# Patient Record
Sex: FEMALE | Race: WHITE | NOT HISPANIC OR LATINO | Employment: FULL TIME | URBAN - METROPOLITAN AREA
[De-identification: names, ages, dates, MRNs, and addresses within clinical notes are randomized per-mention and may not be internally consistent; named-entity substitution may affect disease eponyms.]

---

## 2017-04-05 ENCOUNTER — OFFICE VISIT (OUTPATIENT)
Dept: URGENT CARE | Facility: PHYSICIAN GROUP | Age: 57
End: 2017-04-05
Payer: COMMERCIAL

## 2017-04-05 VITALS
RESPIRATION RATE: 16 BRPM | DIASTOLIC BLOOD PRESSURE: 76 MMHG | BODY MASS INDEX: 30.36 KG/M2 | SYSTOLIC BLOOD PRESSURE: 124 MMHG | HEART RATE: 80 BPM | TEMPERATURE: 98.6 F | HEIGHT: 62 IN | OXYGEN SATURATION: 96 % | WEIGHT: 165 LBS

## 2017-04-05 DIAGNOSIS — J01.40 ACUTE NON-RECURRENT PANSINUSITIS: Primary | ICD-10-CM

## 2017-04-05 DIAGNOSIS — M94.0 ACUTE COSTOCHONDRITIS: ICD-10-CM

## 2017-04-05 DIAGNOSIS — R05.9 COUGH: ICD-10-CM

## 2017-04-05 DIAGNOSIS — J30.1 SEASONAL ALLERGIC RHINITIS DUE TO POLLEN: ICD-10-CM

## 2017-04-05 PROCEDURE — 99214 OFFICE O/P EST MOD 30 MIN: CPT | Performed by: PHYSICIAN ASSISTANT

## 2017-04-05 RX ORDER — CLARITHROMYCIN 500 MG/1
500 TABLET, COATED ORAL 2 TIMES DAILY
Qty: 20 TAB | Refills: 0 | Status: SHIPPED | OUTPATIENT
Start: 2017-04-05 | End: 2017-10-13

## 2017-04-05 RX ORDER — FLUCONAZOLE 150 MG/1
150 TABLET ORAL DAILY
Qty: 1 TAB | Refills: 0 | Status: SHIPPED | OUTPATIENT
Start: 2017-04-05 | End: 2017-10-13

## 2017-04-05 RX ORDER — CODEINE PHOSPHATE/GUAIFENESIN 10-100MG/5
10 LIQUID (ML) ORAL 4 TIMES DAILY PRN
Qty: 200 ML | Refills: 0 | Status: SHIPPED | OUTPATIENT
Start: 2017-04-05 | End: 2017-10-13

## 2017-04-05 ASSESSMENT — ENCOUNTER SYMPTOMS
FEVER: 0
WHEEZING: 0
MYALGIAS: 1
SPUTUM PRODUCTION: 1
COUGH: 1
SHORTNESS OF BREATH: 0

## 2017-04-05 NOTE — MR AVS SNAPSHOT
"        Isabelle Jeffers Schwab   2017 9:00 AM   Office Visit   MRN: [id number]    Department:  [facility]   Dept Phone:  [phone]    Description:  Female : 1960   Provider:  Peggy Chappell PA-C           Reason for Visit     Cough C/o wet cough, runny nose, post nasal drip, sore chest from coughing x2 weeks      Allergies as of 2017     Allergen Noted Reactions    Nitrofurantoin 2014       Penicillins 2014       Sulfa Drugs 2014         You were diagnosed with     Acute non-recurrent pansinusitis   [4111729]  -  Primary     Cough   [786.2.ICD-9-CM]       Seasonal allergic rhinitis due to pollen   [[id number]]         Vital Signs     Blood Pressure Pulse Temperature Respirations Height Weight    124/76 mmHg 80 37 °C (98.6 °F) 16 1.575 m (5' 2.01\") 74.844 kg (165 lb)    Body Mass Index Oxygen Saturation Smoking Status             30.17 kg/m2 96% Current Every Day Smoker         Basic Information     Date Of Birth Sex Race Ethnicity Preferred Language    1960 Female White Non- English      Problem List              ICD-10-CM Priority Class Noted - Resolved    Postmenopausal HRT (hormone replacement therapy) Z79.890   2014 - Present    Essential hypertension I10   10/9/2015 - Present    EBV infection B27.90   2016 - Present    Sleep apnea G47.30   2016 - Present    Overweight E66.3   2016 - Present    Dyslipidemia E78.5   2016 - Present    Vitamin D deficiency E55.9   2016 - Present    Tobacco abuse Z72.0   2016 - Present    Postmenopausal status Z78.0   2016 - Present      Health Maintenance        Date Due Completion Dates    IMM DTaP/Tdap/Td Vaccine (1 - Tdap) 1978 ---    IMM PNEUMOCOCCAL 19-64 (ADULT) MEDIUM RISK SERIES (1 of 1 - PPSV23) 1978 ---    MAMMOGRAM 2008, 10/12/2007, 10/12/2007    PAP SMEAR 2014 (Prv Comp)    Override on 2011: Previously completed    COLONOSCOPY " 9/9/2020 9/9/2010 (Prv Comp)    Override on 9/9/2010: Previously completed            Current Immunizations     No immunizations on file.      Below and/or attached are the medications your provider expects you to take. Review all of your home medications and newly ordered medications with your provider and/or pharmacist. Follow medication instructions as directed by your provider and/or pharmacist. Please keep your medication list with you and share with your provider. Update the information when medications are discontinued, doses are changed, or new medications (including over-the-counter products) are added; and carry medication information at all times in the event of emergency situations     Allergies:  NITROFURANTOIN - (reactions not documented)     PENICILLINS - (reactions not documented)     SULFA DRUGS - (reactions not documented)               Medications  Valid as of: April 05, 2017 -  9:36 AM    Generic Name Brand Name Tablet Size Instructions for use    Azithromycin (Tab) ZITHROMAX 250 MG 2 TABS ON DAY 1, 1 TAB ON DAYS 2-5.        Benazepril-Hydrochlorothiazide (Tab) LOTENSIN HCT 20-12.5 MG TAKE 1 TAB BY MOUTH EVERY DAY.        Clarithromycin (Tab) BIAXIN 500 MG Take 1 Tab by mouth 2 times a day.        Estradiol (Tab) ESTRACE 1 MG TAKE 1 TAB BY MOUTH EVERY DAY.        Fluconazole (Tab) DIFLUCAN 150 MG Take 1 Tab by mouth every day.        Guaifenesin-Codeine (Syrup) TUSSI-ORGANIDIN -10 MG/5ML Take 10 mL by mouth 4 times a day as needed for Cough.        Loratadine   Take  by mouth.        .                 Medicines prescribed today were sent to:     [facility] #[id number] - [address] - [place] [address]    Phone: [phone] Fax: [phone]    Open 24 Hours?: No      Medication refill instructions:       If your prescription bottle indicates you have medication refills left, it is not necessary to call your provider’s office. Please contact your pharmacy and they will  refill your medication.    If your prescription bottle indicates you do not have any refills left, you may request refills at any time through one of the following ways: The online [email or web address] system (except Urgent Care), by calling your provider’s office, or by asking your pharmacy to contact your provider’s office with a refill request. Medication refills are processed only during regular business hours and may not be available until the next business day. Your provider may request additional information or to have a follow-up visit with you prior to refilling your medication.   *Please Note: Medication refills are assigned a new Rx number when refilled electronically. Your pharmacy may indicate that no refills were authorized even though a new prescription for the same medication is available at the pharmacy. Please request the medicine by name with the pharmacy before contacting your provider for a refill.           [email or web address] Access Code: Activation code not generated  Current Women.com Status: Active          Quit Tobacco Information     Do you want to quit using tobacco?    Quitting tobacco decreases risks of cancer, heart and lung disease, increases life expectancy, improves sense of taste and smell, and increases spending money, among other benefits.    If you are thinking about quitting, we can help.  • [facility] Quit Tobacco Program: [phone]  o Program occurs weekly for four weeks and includes pharmacist consultation on products to support quitting smoking or chewing tobacco. A provider referral is needed for pharmacist consultation.  • Tobacco Users Help Hotline: 2-589-QUIT-NOW (454-5500) or https://[place].quitlogix.org/  o Free, confidential telephone and online coaching for [place] residents. Sessions are designed on a schedule that is convenient for you. Eligible clients receive free nicotine replacement therapy.  • Nationally: www.smokefree.gov  o Information and professional assistance to support both  immediate and long-term needs as you become, and remain, a non-smoker. Smokefree.gov allows you to choose the help that best fits your needs.

## 2017-04-05 NOTE — PROGRESS NOTES
Subjective:      Elizabeth Madel Schwab is a 56 y.o. female who presents with Cough    PMH:  has a past medical history of Kidney disease; EBV infection (5/17/2016); Vitamin D deficiency (5/17/2016); Hypertension; Shingles; CAP (community acquired pneumonia); and Postmenopausal HRT (hormone replacement therapy).  MEDS:   Current outpatient prescriptions:   •  Loratadine (CLARITIN PO), Take  by mouth., Disp: , Rfl:   •  benazepril-hydrochlorthiazide (LOTENSIN HCT) 20-12.5 MG per tablet, TAKE 1 TAB BY MOUTH EVERY DAY., Disp: 30 Tab, Rfl: 0  •  estradiol (ESTRACE) 1 MG Tab, TAKE 1 TAB BY MOUTH EVERY DAY., Disp: 30 Tab, Rfl: 0  •  azithromycin (ZITHROMAX) 250 MG Tab, 2 TABS ON DAY 1, 1 TAB ON DAYS 2-5., Disp: 6 Tab, Rfl: 1  ALLERGIES:   Allergies   Allergen Reactions   • Nitrofurantoin    • Penicillins    • Sulfa Drugs      SURGHX:   Past Surgical History   Procedure Laterality Date   • Abdominal hysterectomy total       SOCHX:  reports that she has been smoking Cigarettes.  She has been smoking about 0.50 packs per day. She has never used smokeless tobacco. She reports that she does not drink alcohol or use illicit drugs.  FH: family history includes Arthritis in her mother; Dementia in her mother; Heart Attack in her father; Heart Disease in her father. Reviewed with patient/family. Not pertinent to this complaint.            HPI Comments: Patient presents with:  Cough: C/o wet cough, runny nose, post nasal drip, sore chest from coughing x2 weeks        Cough  This is a new problem. The current episode started 1 to 4 weeks ago. The problem has been unchanged. The problem occurs every few minutes. Associated symptoms include myalgias. Pertinent negatives include no fever, shortness of breath or wheezing. The symptoms are aggravated by lying down and pollens (exertion). Risk factors for lung disease include smoking/tobacco exposure. She has tried body position changes, OTC cough suppressant and rest for the symptoms.  "The treatment provided no relief. Her past medical history is significant for bronchitis and environmental allergies.       Review of Systems   Constitutional: Negative for fever.   HENT: Positive for congestion.    Respiratory: Positive for cough and sputum production. Negative for shortness of breath and wheezing.    Musculoskeletal: Positive for myalgias.   Endo/Heme/Allergies: Positive for environmental allergies.   All other systems reviewed and are negative.         Objective:     /76 mmHg  Pulse 80  Temp(Src) 37 °C (98.6 °F)  Resp 16  Ht 1.575 m (5' 2.01\")  Wt 74.844 kg (165 lb)  BMI 30.17 kg/m2  SpO2 96%     Physical Exam   Constitutional: She is oriented to person, place, and time. She appears well-developed and well-nourished. No distress.   HENT:   Head: Normocephalic.   Right Ear: Tympanic membrane normal.   Left Ear: Tympanic membrane normal.   Nose: Mucosal edema present. Right sinus exhibits maxillary sinus tenderness and frontal sinus tenderness. Left sinus exhibits maxillary sinus tenderness and frontal sinus tenderness.   Mouth/Throat: Uvula is midline and mucous membranes are normal.   PND visible in posterior oropharynx   Eyes: EOM are normal. Pupils are equal, round, and reactive to light.   Neck: Normal range of motion. Neck supple.   Cardiovascular: Normal rate, regular rhythm and normal heart sounds.    Pulmonary/Chest: Effort normal. No respiratory distress. She has no wheezes. She has rhonchi. She has no rales.   Abdominal: Soft.   Musculoskeletal: Normal range of motion.   Lymphadenopathy:     She has no cervical adenopathy.   Neurological: She is alert and oriented to person, place, and time.   Skin: Skin is warm and dry.   Psychiatric: She has a normal mood and affect.   Nursing note and vitals reviewed.         Assessment/Plan:     1. Acute non-recurrent pansinusitis  Loratadine (CLARITIN PO)    clarithromycin (BIAXIN) 500 MG Tab    guaifenesin-codeine (TUSSI-ORGANIDIN NR) " 100-10 MG/5ML syrup    fluconazole (DIFLUCAN) 150 MG tablet   2. Cough  Loratadine (CLARITIN PO)    clarithromycin (BIAXIN) 500 MG Tab    guaifenesin-codeine (TUSSI-ORGANIDIN NR) 100-10 MG/5ML syrup    fluconazole (DIFLUCAN) 150 MG tablet   3. Seasonal allergic rhinitis due to pollen  Loratadine (CLARITIN PO)    clarithromycin (BIAXIN) 500 MG Tab    guaifenesin-codeine (TUSSI-ORGANIDIN NR) 100-10 MG/5ML syrup    fluconazole (DIFLUCAN) 150 MG tablet   4. Acute costochondritis  Loratadine (CLARITIN PO)    clarithromycin (BIAXIN) 500 MG Tab    guaifenesin-codeine (TUSSI-ORGANIDIN NR) 100-10 MG/5ML syrup    fluconazole (DIFLUCAN) 150 MG tablet     PT can continue OTC medications, increase fluids and rest until symptoms improve.     Pt to add OTC Coricidin HBP (not sudafed-pt verbalized understanding of this) for congestion.      PT should follow up with PCP in 1-2 days for re-evaluation if symptoms have not improved.  Discussed red flags and reasons to return to UC or ED.  Pt and/or family verbalized understanding of diagnosis and follow up instructions and declined informational handout on diagnosis.  PT discharged.

## 2017-10-13 ENCOUNTER — TELEPHONE (OUTPATIENT)
Dept: URGENT CARE | Facility: PHYSICIAN GROUP | Age: 57
End: 2017-10-13

## 2017-10-13 ENCOUNTER — OFFICE VISIT (OUTPATIENT)
Dept: URGENT CARE | Facility: PHYSICIAN GROUP | Age: 57
End: 2017-10-13
Payer: COMMERCIAL

## 2017-10-13 VITALS
TEMPERATURE: 98.4 F | RESPIRATION RATE: 16 BRPM | SYSTOLIC BLOOD PRESSURE: 150 MMHG | HEIGHT: 62 IN | OXYGEN SATURATION: 97 % | DIASTOLIC BLOOD PRESSURE: 90 MMHG | BODY MASS INDEX: 29.81 KG/M2 | HEART RATE: 90 BPM | WEIGHT: 162 LBS

## 2017-10-13 DIAGNOSIS — T78.40XA ALLERGIC REACTION, INITIAL ENCOUNTER: ICD-10-CM

## 2017-10-13 PROCEDURE — 99213 OFFICE O/P EST LOW 20 MIN: CPT | Performed by: PHYSICIAN ASSISTANT

## 2017-10-13 RX ORDER — CLINDAMYCIN HYDROCHLORIDE 300 MG/1
300 CAPSULE ORAL 3 TIMES DAILY
COMMUNITY
End: 2017-10-13

## 2017-10-13 RX ORDER — METHYLPREDNISOLONE 4 MG/1
TABLET ORAL
Qty: 21 TAB | Refills: 0 | Status: SHIPPED | OUTPATIENT
Start: 2017-10-13 | End: 2017-12-28

## 2017-10-13 NOTE — PROGRESS NOTES
Chief Complaint   Patient presents with   • Bug Bite     L. foot and hand swelling and pain, nausea, chills  starting yesterday       HISTORY OF PRESENT ILLNESS: Patient is a 57 y.o. female who presents today for about 24 hours of feeling unwell.  Patient states it started with itching, swelling locally on the bottom of her left foot. Swelling down there today but still painful.  She also noticed this morning that her left hand was swollen, itchy.   She has had chills on and off since these both began without fevers.   Patient has had looser stools in last few days without abdominal pain but is currently on her second round of Clindamycin after dental work.   She states in waiting room she had a few mins of chest tightness but felt that had to do with feeling anxious about her visit as it has completely resolved.   She took a 10 mg Claritin today. Seems to have helped the foot swelling.     Patient Active Problem List    Diagnosis Date Noted   • EBV infection 05/17/2016   • Sleep apnea 05/17/2016   • Overweight 05/17/2016   • Dyslipidemia 05/17/2016   • Vitamin D deficiency 05/17/2016   • Tobacco abuse 05/17/2016   • Postmenopausal status 05/17/2016   • Essential hypertension 10/09/2015   • Postmenopausal HRT (hormone replacement therapy) 09/09/2014       Allergies:Nitrofurantoin; Penicillins; and Sulfa drugs    Current Outpatient Prescriptions Ordered in [facility]   Medication Sig Dispense Refill   • Loratadine (CLARITIN PO) Take  by mouth.     • benazepril-hydrochlorthiazide (LOTENSIN HCT) 20-12.5 MG per tablet TAKE 1 TAB BY MOUTH EVERY DAY. 30 Tab 0   • estradiol (ESTRACE) 1 MG Tab TAKE 1 TAB BY MOUTH EVERY DAY. 30 Tab 0     No current Epic-ordered facility-administered medications on file.        Past Medical History:   Diagnosis Date   • EBV infection 5/17/2016   • Vitamin D deficiency 5/17/2016   • CAP (community acquired pneumonia)    • Hypertension    • Kidney disease    • Postmenopausal HRT (hormone replacement  "therapy)    • Shingles     L SHOULDER       Social History   Substance Use Topics   • Smoking status: Current Every Day Smoker     Packs/day: 0.50     Types: Cigarettes   • Smokeless tobacco: Never Used      Comment: 1-2 day   • Alcohol use No       Family Status   Relation Status   • Mother    • Father      Family History   Problem Relation Age of Onset   • Dementia Mother    • Arthritis Mother    • Heart Attack Father    • Heart Disease Father        ROS:  Review of Systems   Constitutional: SEE HPI   HENT: Negative for ear pain, nosebleeds, congestion, sore throat and neck pain.    Eyes: Negative for blurred vision.   Respiratory: Negative for cough, sputum production, shortness of breath and wheezing.    Cardiovascular: Negative for chest pain, palpitations, orthopnea and leg swelling.   Gastrointestinal: SEE HPI  Skin: SEE HPI  All other systems reviewed and are negative.       Exam:  Blood pressure 150/90, pulse 90, temperature 36.9 °C (98.4 °F), resp. rate 16, height 1.575 m (5' 2\"), weight 73.5 kg (162 lb), SpO2 97 %.  General:  Well nourished, well developed female in NAD  Eyes: PERRLA, EOM within normal limits, no conjunctival injection, no scleral icterus, visual fields and acuity grossly intact.  Ears: Normal shape and symmetry, no tenderness, no discharge. External canals are without any significant edema or erythema. Tympanic membranes are without any inflammation, no effusion. Gross auditory acuity is intact  Nose: Symmetrical, sinuses without tenderness, no discharge.   Mouth: reasonable hygiene, no erythema exudates or tonsillar enlargement.  Neck: no masses, range of motion within normal limits, no tracheal deviation. No lymphadenopathy  Pulmonary: Normal respiratory effort, no wheezes, crackles, or rhonchi.  Cardiovascular: regular rate and rhythm without murmurs, rubs, or gallops.  Abdomen: Soft, nontender, nondistended. Normal bowel sounds. No hepatosplenomegaly or masses, or " hernias. No rebound or guarding.  Skin:  Warm, pink, dry.   Left hand with mild diffuse swelling and minimal erythema. No focal lesions or urticaria.   Left plantar foot with less swelling, however appears to be larger slightly raised erythematous plaque.    Extremities: no clubbing, cyanosis, or edema.  Neuro: A&O x 3. Speech normal/clear.  Normal gait.         Assessment/Plan:  1. Allergic reaction, initial encounter         -suspect allergic reaction due to Clinda at this point.   She has a few days left of abx, is asymptomatic.  Recommend d/c at this time  -bendaryl every 6 hours prn.    -probiotics/yogurt for diarrhea and monitor for resolution of this off the abx.   -RTC and ER precautions  -due to episode of chest discomfort/tightness waiting to be seen recommend EKG.  She declines at this point given complete resolution.       Supportive care, differential diagnoses, and indications for immediate follow-up discussed with patient.   Pathogenesis of diagnosis discussed including typical length and natural progression.   Instructed to return to clinic or nearest emergency department for any change in condition, further concerns, or worsening of symptoms.  Patient states understanding of the plan of care and discharge instructions.      Rachael Genao P.A.-C.

## 2017-10-13 NOTE — TELEPHONE ENCOUNTER
Called and left messages for patient on both phone numbers advising her of the prescription and advice.

## 2017-10-13 NOTE — TELEPHONE ENCOUNTER
Will rx steroid.   Stop Clindamycin.  Continue Benadryl as discussed  Clinic follow up if not improving or continuing to worsen

## 2017-10-13 NOTE — TELEPHONE ENCOUNTER
Patient was seen this morning and states the swelling is worse now and its on both sides. Patient would like a steroid prescribed if possible. She states the last time she had an allergic reaction a steroid really helped. Please advise.

## 2017-10-13 NOTE — LETTER
October 13, 2017         Patient: Elizabeth Madel Schwab   YOB: 1960   Date of Visit: 10/13/2017           To Whom it May Concern:    Elizabeth Schwab was seen in my clinic on 10/13/2017. She is to be off work today to recover.     If you have any questions or concerns, please don't hesitate to call.        Sincerely,           Rachael Genao P.A.-C.  Electronically Signed

## 2017-10-16 ENCOUNTER — TELEPHONE (OUTPATIENT)
Dept: URGENT CARE | Facility: PHYSICIAN GROUP | Age: 57
End: 2017-10-16

## 2017-10-16 DIAGNOSIS — B37.9 YEAST INFECTION: ICD-10-CM

## 2017-10-16 RX ORDER — FLUCONAZOLE 150 MG/1
TABLET ORAL
Qty: 2 TAB | Refills: 0 | Status: SHIPPED | OUTPATIENT
Start: 2017-10-16 | End: 2017-12-28

## 2017-10-16 NOTE — TELEPHONE ENCOUNTER
Yes, will send in Fluconazole tablets to be taken as directed on label.    RTC if not getting better however for check.

## 2017-10-16 NOTE — TELEPHONE ENCOUNTER
1. Caller Name: Isabelle                                         Call Back Number: [phone] (home)         Patient approves a detailed voicemail message: N\A    2. What are the patient's symptoms (location & severity)? Yeast infection    3. Is this a new symptom Yes    4. When did it start? Patient called stating that the antibiotics that were prescribed on Friday has given her a yeast infection. Patient would like to know if she could get a medication to help treat that infection.  Please advise.    5. Action taken per Active Symptom Guide: Urgent Care recommended

## 2017-10-19 ENCOUNTER — HOSPITAL ENCOUNTER (OUTPATIENT)
Facility: MEDICAL CENTER | Age: 57
End: 2017-10-19
Attending: NURSE PRACTITIONER
Payer: COMMERCIAL

## 2017-10-19 ENCOUNTER — OFFICE VISIT (OUTPATIENT)
Dept: URGENT CARE | Facility: PHYSICIAN GROUP | Age: 57
End: 2017-10-19
Payer: COMMERCIAL

## 2017-10-19 VITALS
WEIGHT: 160 LBS | OXYGEN SATURATION: 98 % | TEMPERATURE: 98.7 F | RESPIRATION RATE: 16 BRPM | BODY MASS INDEX: 29.44 KG/M2 | HEART RATE: 88 BPM | HEIGHT: 62 IN | DIASTOLIC BLOOD PRESSURE: 86 MMHG | SYSTOLIC BLOOD PRESSURE: 122 MMHG

## 2017-10-19 DIAGNOSIS — R30.0 DYSURIA: ICD-10-CM

## 2017-10-19 DIAGNOSIS — R39.9 UTI SYMPTOMS: ICD-10-CM

## 2017-10-19 LAB
APPEARANCE UR: NORMAL
BILIRUB UR STRIP-MCNC: NEGATIVE MG/DL
COLOR UR AUTO: YELLOW
GLUCOSE UR STRIP.AUTO-MCNC: NEGATIVE MG/DL
KETONES UR STRIP.AUTO-MCNC: NEGATIVE MG/DL
LEUKOCYTE ESTERASE UR QL STRIP.AUTO: NEGATIVE
NITRITE UR QL STRIP.AUTO: NEGATIVE
PH UR STRIP.AUTO: 6.5 [PH] (ref 5–8)
PROT UR QL STRIP: NEGATIVE MG/DL
RBC UR QL AUTO: NEGATIVE
SP GR UR STRIP.AUTO: 1.01
UROBILINOGEN UR STRIP-MCNC: NEGATIVE MG/DL

## 2017-10-19 PROCEDURE — 87086 URINE CULTURE/COLONY COUNT: CPT

## 2017-10-19 PROCEDURE — 99214 OFFICE O/P EST MOD 30 MIN: CPT | Performed by: NURSE PRACTITIONER

## 2017-10-19 PROCEDURE — 81002 URINALYSIS NONAUTO W/O SCOPE: CPT | Performed by: NURSE PRACTITIONER

## 2017-10-19 PROCEDURE — 99000 SPECIMEN HANDLING OFFICE-LAB: CPT | Performed by: NURSE PRACTITIONER

## 2017-10-19 RX ORDER — DIPHENHYDRAMINE HCL 25 MG
25 TABLET ORAL EVERY 6 HOURS PRN
COMMUNITY
End: 2021-05-18

## 2017-10-19 RX ORDER — CIPROFLOXACIN 500 MG/1
500 TABLET, FILM COATED ORAL 2 TIMES DAILY
Qty: 14 TAB | Refills: 0 | Status: SHIPPED | OUTPATIENT
Start: 2017-10-19 | End: 2017-10-26

## 2017-10-19 RX ORDER — PHENAZOPYRIDINE HYDROCHLORIDE 100 MG/1
100 TABLET, FILM COATED ORAL 3 TIMES DAILY PRN
Qty: 6 TAB | Refills: 0 | Status: SHIPPED | OUTPATIENT
Start: 2017-10-19 | End: 2017-12-28

## 2017-10-19 ASSESSMENT — ENCOUNTER SYMPTOMS
FEVER: 0
CONSTIPATION: 0
ABDOMINAL PAIN: 0
SHORTNESS OF BREATH: 0
ORTHOPNEA: 0
DIZZINESS: 0
NAUSEA: 0
DIARRHEA: 0
WEAKNESS: 0
HEADACHES: 0
MYALGIAS: 1
BACK PAIN: 1
PALPITATIONS: 0
VOMITING: 0
CHILLS: 0
FLANK PAIN: 0

## 2017-10-19 NOTE — PATIENT INSTRUCTIONS
Urinary Tract Infection  A urinary tract infection (UTI) can occur any place along the urinary tract. The tract includes the kidneys, ureters, bladder, and urethra. A type of germ called bacteria often causes a UTI. UTIs are often helped with antibiotic medicine.   HOME CARE   · If given, take antibiotics as told by your doctor. Finish them even if you start to feel better.  · Drink enough fluids to keep your pee (urine) clear or pale yellow.  · Avoid tea, drinks with caffeine, and bubbly (carbonated) drinks.  · Pee often. Avoid holding your pee in for a long time.  · Pee before and after having sex (intercourse).  · Wipe from front to back after you poop (bowel movement) if you are a woman. Use each tissue only once.  GET HELP RIGHT AWAY IF:   · You have back pain.  · You have lower belly (abdominal) pain.  · You have chills.  · You feel sick to your stomach (nauseous).  · You throw up (vomit).  · Your burning or discomfort with peeing does not go away.  · You have a fever.  · Your symptoms are not better in 3 days.  MAKE SURE YOU:   · Understand these instructions.  · Will watch your condition.  · Will get help right away if you are not doing well or get worse.     This information is not intended to replace advice given to you by your health care provider. Make sure you discuss any questions you have with your health care provider.     Document Released: 06/05/2009 Document Revised: 01/08/2016 Document Reviewed: 07/18/2013  [employer] Interactive Patient Education ©2016 [employer]

## 2017-10-19 NOTE — PROGRESS NOTES
"Subjective:      Elizabeth Madel Schwab is a 57 y.o. female who presents with Urinary Frequency (painful urination, low back pain X 2 days )            HPI  Isabelle is a 57 year old female who is her for dysuria and urinary frequency x 2 days. Had just finished steroids for allergic reaction to 2 rounds of Clindamycin due to dental work. C/o low back pain. Taking daily Benadryl for allergic reaction. States UAs \"always come back with nothing on them but then a urine culture shows something\". States her hands and feet still itch.     PMH:  has a past medical history of CAP (community acquired pneumonia); EBV infection (5/17/2016); Hypertension; Kidney disease; Postmenopausal HRT (hormone replacement therapy); Shingles; and Vitamin D deficiency (5/17/2016).  MEDS:   Current Outpatient Prescriptions:   •  diphenhydrAMINE (BENADRYL ALLERGY) 25 MG Tab, Take 25 mg by mouth every 6 hours as needed for Sleep., Disp: , Rfl:   •  ciprofloxacin (CIPRO) 500 MG Tab, Take 1 Tab by mouth 2 times a day for 7 days., Disp: 14 Tab, Rfl: 0  •  phenazopyridine (PYRIDIUM) 100 MG Tab, Take 1 Tab by mouth 3 times a day as needed., Disp: 6 Tab, Rfl: 0  •  MethylPREDNISolone (MEDROL DOSEPAK) 4 MG Tablet Therapy Pack, As directed on the packaging label., Disp: 21 Tab, Rfl: 0  •  benazepril-hydrochlorthiazide (LOTENSIN HCT) 20-12.5 MG per tablet, TAKE 1 TAB BY MOUTH EVERY DAY., Disp: 30 Tab, Rfl: 0  •  estradiol (ESTRACE) 1 MG Tab, TAKE 1 TAB BY MOUTH EVERY DAY., Disp: 30 Tab, Rfl: 0  •  fluconazole (DIFLUCAN) 150 MG tablet, Take 1 tablet once. Repeat in 72 hours if needed, Disp: 2 Tab, Rfl: 0  •  Loratadine (CLARITIN PO), Take  by mouth., Disp: , Rfl:   ALLERGIES:   Allergies   Allergen Reactions   • Clindamycin Hcl Itching and Swelling     Hands and feet    • Nitrofurantoin    • Penicillins    • Sulfa Drugs      SURGHX:   Past Surgical History:   Procedure Laterality Date   • ABDOMINAL HYSTERECTOMY TOTAL       SOCHX:  reports that she has " "been smoking Cigarettes.  She has been smoking about 0.50 packs per day. She has never used smokeless tobacco. She reports that she does not drink alcohol or use drugs.  FH: Family history was reviewed, no pertinent findings to report    Review of Systems   Constitutional: Negative for chills, fever and malaise/fatigue.   Respiratory: Negative for shortness of breath.    Cardiovascular: Negative for chest pain, palpitations and orthopnea.   Gastrointestinal: Negative for abdominal pain, constipation, diarrhea, nausea and vomiting.   Genitourinary: Positive for dysuria, frequency and urgency. Negative for flank pain and hematuria.   Musculoskeletal: Positive for back pain and myalgias.   Skin: Positive for itching and rash.   Neurological: Negative for dizziness, weakness and headaches.   All other systems reviewed and are negative.         Objective:     /86   Pulse 88   Temp 37.1 °C (98.7 °F)   Resp 16   Ht 1.575 m (5' 2\")   Wt 72.6 kg (160 lb)   SpO2 98%   BMI 29.26 kg/m²      Physical Exam   Constitutional: She is oriented to person, place, and time. Vital signs are normal. She appears well-developed and well-nourished. She is active and cooperative. No distress.   HENT:   Head: Normocephalic.   Eyes: Conjunctivae and EOM are normal. Pupils are equal, round, and reactive to light.   Neck: Normal range of motion. Neck supple.   Cardiovascular: Normal rate and regular rhythm.    Pulmonary/Chest: Effort normal and breath sounds normal.   Abdominal: Soft. Bowel sounds are normal. She exhibits no distension. There is no tenderness. There is no rigidity, no rebound, no guarding and no CVA tenderness.   Musculoskeletal: Normal range of motion.   Lymphadenopathy:     She has no cervical adenopathy.   Neurological: She is alert and oriented to person, place, and time.   Skin: Skin is warm and dry. She is not diaphoretic.   Vitals reviewed.    POC Color Yellow Negative Final   POC Appearance Hazy Negative " Final   POC Leukocyte Esterase Negative Negative Final   POC Nitrites Negative Negative Final   POC Urobiligen Negative Negative (0.2) mg/dL Final   POC Protein Negative Negative mg/dL Final   POC Urine PH 6.5 5.0 - 8.0 Final   POC Blood Negative Negative Final   POC Specific Gravity 1.015 <1.005 - >1.030 Final   POC Ketones Negative Negative mg/dL Final   POC Biliruben Negative Negative mg/dL Final   POC Glucose Negative Negative mg/dL Final               Assessment/Plan:     1. Dysuria    - POCT Urinalysis  - Urine Culture; Future  - phenazopyridine (PYRIDIUM) 100 MG Tab; Take 1 Tab by mouth 3 times a day as needed.  Dispense: 6 Tab; Refill: 0    2. UTI symptoms    - ciprofloxacin (CIPRO) 500 MG Tab; Take 1 Tab by mouth 2 times a day for 7 days.  Dispense: 14 Tab; Refill: 0    Patient requesting abx for urinary symptoms even though UA did not show any signs of infection  Begin to take longer acting antihistamine prn for itchiness  Increase water intake  Urinate more frequently and empty bladder completely  Practice good toileting hygiene after bowel movements and sexual intercourse, refrain from sexual intercourse until infection cleared  Monitor for back/flank pain, difficulty urinating, blood in urine- need re-evaluation  Monitor for Achilles' pain due to recent steroid use with CIPRO use, patient understands this

## 2017-10-21 LAB
BACTERIA UR CULT: NORMAL
SIGNIFICANT IND 70042: NORMAL
SOURCE SOURCE: NORMAL

## 2017-12-20 ENCOUNTER — OFFICE VISIT (OUTPATIENT)
Dept: URGENT CARE | Facility: PHYSICIAN GROUP | Age: 57
End: 2017-12-20
Payer: COMMERCIAL

## 2017-12-20 VITALS
WEIGHT: 158 LBS | DIASTOLIC BLOOD PRESSURE: 84 MMHG | HEART RATE: 82 BPM | OXYGEN SATURATION: 97 % | BODY MASS INDEX: 29.08 KG/M2 | RESPIRATION RATE: 16 BRPM | SYSTOLIC BLOOD PRESSURE: 124 MMHG | TEMPERATURE: 98.3 F | HEIGHT: 62 IN

## 2017-12-20 DIAGNOSIS — J22 LOWER RESPIRATORY INFECTION (E.G., BRONCHITIS, PNEUMONIA, PNEUMONITIS, PULMONITIS): Primary | ICD-10-CM

## 2017-12-20 DIAGNOSIS — G93.31 POST-INFLUENZA SYNDROME: ICD-10-CM

## 2017-12-20 PROCEDURE — 99214 OFFICE O/P EST MOD 30 MIN: CPT | Performed by: PHYSICIAN ASSISTANT

## 2017-12-20 RX ORDER — AZITHROMYCIN 250 MG/1
TABLET, FILM COATED ORAL
Qty: 6 TAB | Refills: 0 | Status: SHIPPED | OUTPATIENT
Start: 2017-12-20 | End: 2017-12-28

## 2017-12-20 RX ORDER — ALBUTEROL SULFATE 90 UG/1
2 AEROSOL, METERED RESPIRATORY (INHALATION) EVERY 4 HOURS PRN
Qty: 1 INHALER | Refills: 0 | Status: SHIPPED | OUTPATIENT
Start: 2017-12-20 | End: 2020-09-25

## 2017-12-20 RX ORDER — METHYLPREDNISOLONE 4 MG/1
4 TABLET ORAL DAILY
Qty: 1 KIT | Refills: 0 | Status: SHIPPED | OUTPATIENT
Start: 2017-12-20 | End: 2017-12-28

## 2017-12-20 ASSESSMENT — ENCOUNTER SYMPTOMS
NEUROLOGICAL NEGATIVE: 1
COUGH: 1
MUSCULOSKELETAL NEGATIVE: 1
CHILLS: 1
PSYCHIATRIC NEGATIVE: 1
CARDIOVASCULAR NEGATIVE: 1
FEVER: 1
GASTROINTESTINAL NEGATIVE: 1
EYES NEGATIVE: 1

## 2017-12-20 NOTE — PATIENT INSTRUCTIONS
Flonase and nasal saline irrigation (netti pot or Eriberto Med Sinus Rinse).  Used distilled water or boiled tap water with nasal flushes, not straight tap water.  Humidifier at bedtime.  Hot steam showers to loosen up mucous.  Cough medicine at bedtime.  Lots of fluids, tea with honey.  Ibuprofen for headache, fever, chills.  Be sure to take with food.  Salt water gargles.  Switch toothbrush in 2 days.  Return if worsening: Yellow thicker mucus changes, worsening pain around the eyes and radiates to the teeth, and fever over 101°F.

## 2017-12-20 NOTE — PROGRESS NOTES
Subjective:      Elizabeth Madel Schwab is a 57 y.o. female who presents with Cough (Coughed up blood last night, pain in ribs when coughing)            HPI  Chief Complaint   Patient presents with   • Cough     Coughed up blood last night, pain in ribs when coughing       HPI:  Elizabeth Madel Schwab is a 57 y.o. female who presents with flu like symptoms since Friday.  Coughed up blood in lungs.  Started out clear and now brown and yellow.  Nose started yesterday, clear.  Rib pain from cough so much, mostly in the front.  PNE once 20 years ago.  Has tried ibuprofen for body aches and theraflu with no improvement.  Patient denies  SOB, chest pain, palpitations, or n/v/d.    Lots of sick contacts at work.    Past Medical History:   Diagnosis Date   • CAP (community acquired pneumonia)    • EBV infection 5/17/2016   • Hypertension    • Kidney disease    • Postmenopausal HRT (hormone replacement therapy)    • Shingles     L SHOULDER   • Vitamin D deficiency 5/17/2016       Past Surgical History:   Procedure Laterality Date   • ABDOMINAL HYSTERECTOMY TOTAL         Family History   Problem Relation Age of Onset   • Dementia Mother    • Arthritis Mother    • Heart Attack Father    • Heart Disease Father      No pertinent family history.    Social History     Social History   • Marital status:      Spouse name: N/A   • Number of children: N/A   • Years of education: N/A     Occupational History   • Not on file.     Social History Main Topics   • Smoking status: Current Every Day Smoker     Packs/day: 0.50     Types: Cigarettes   • Smokeless tobacco: Never Used      Comment: 1-2 day, intermittent use   • Alcohol use No   • Drug use: No   • Sexual activity: Yes     Partners: Male     Other Topics Concern   • Not on file     Social History Narrative   • No narrative on file         Current Outpatient Prescriptions:   •  benazepril-hydrochlorthiazide, TAKE 1 TAB BY MOUTH EVERY DAY.  •  estradiol, TAKE 1 TAB BY MOUTH  "EVERY DAY.  •  diphenhydrAMINE, 25 mg, Oral, Q6HRS PRN  •  phenazopyridine, 100 mg, Oral, TID PRN  •  fluconazole, Take 1 tablet once. Repeat in 72 hours if needed  •  MethylPREDNISolone, As directed on the packaging label., 10/18/2017  •  Loratadine (CLARITIN PO), Take  by mouth.    Allergies   Allergen Reactions   • Clindamycin Hcl Itching and Swelling     Hands and feet    • Nitrofurantoin    • Penicillins    • Sulfa Drugs         Review of Systems   Constitutional: Positive for chills, fever and malaise/fatigue.   HENT: Positive for congestion.    Eyes: Negative.    Respiratory: Positive for cough.    Cardiovascular: Negative.    Gastrointestinal: Negative.    Genitourinary: Negative.    Musculoskeletal: Negative.    Skin: Negative.    Neurological: Negative.    Endo/Heme/Allergies: Negative.    Psychiatric/Behavioral: Negative.           Objective:     /84   Pulse 82   Temp 36.8 °C (98.3 °F)   Resp 16   Ht 1.575 m (5' 2\")   Wt 71.7 kg (158 lb)   SpO2 97%   BMI 28.90 kg/m²      Physical Exam       Nursing note and vital signs reviewed.    Constitutional:  Appropriately groomed, pleasant affect, well nourished, and in no acute distress.    HEENT:  Head: Atruamatic, normocephalic.    Ears:  EAC with mild cerumen bilaterally, not erythematous.  TM’s pearly gray with cone of light present and umbo and malleolus visible bilaterally.  No bulging or fluid bubbles present in middle ear.    Eyes:  PERRLA, EOM's full, sclera white, conjunctiva not erythematous, and medial canthus without exudate bilaterally.    Nose:  Nares patent bilaterally.  Nasal mucosa edematous with clear rhinorrhea bilaterally.  Frontal and maxillary sinuses not tender to percussion.    Throat:  Oropharynx erythematous, with no enlargement of the palatine tonsils bilaterally with no exudates.    Posterior oropharynx with cobblestoning and clear to white post nasal drainage present.  Soft palate rises symmetrically bilaterally and uvula " midline.  Neck supple, with mild proximal anterior cervical chain lymphadenopathy that is soft and mobile to palpation.      Lungs: Respiratory effort within normal limits. Lungs With slight bibasilar crackles . Rhonchi clear to cough.      Heart:  Regular rate and rhythm without rubs, murmurs, or gallops. Dorsalis pedis and radial pulses 2+ bilaterally. No lower extremity edema.    Muscle skeletal:  Gait and station wnl, non antalgic.    Derm:  No lesions or rashes. Overall good turgor pressure.     Psychiatric:  Normal judgement, mood and affect.       Assessment/Plan:     1. Lower respiratory infection (e.g., bronchitis, pneumonia, pneumonitis, pulmonitis)  azithromycin (ZITHROMAX) 250 MG Tab    MethylPREDNISolone (MEDROL DOSEPAK) 4 MG Tablet Therapy Pack    albuterol 108 (90 Base) MCG/ACT Aero Soln inhalation aerosol   2. Post-influenza syndrome        Patient presents with flulike symptoms that started on Friday. Multiple sick contacts patient was exposed to. Now with worsening lower respiratory involvement. Patient's currently in every day smoker. Distant history of pneumonia. On exam patient does have a inspiratory and expiratory wheezes with questionable crackles. Discussed x-ray patient deferred at this time. Prescribed azithromycin and Medrol Dosepak and albuterol inhaler. Advised returning to the clinic or ER symptoms worsen.    Patient was in agreement with this treatment plan and seemed to understand without barriers. All questions were encouraged and answered.  Reviewed signs and symptoms of when to seek emergency medical care.     Please note that this dictation was created using voice recognition software.  I have made every reasonable attempt to correct obvious errors, but I expect there are errors of warren and possibly content that I did not discover before finalizing the note.

## 2017-12-27 ENCOUNTER — TELEPHONE (OUTPATIENT)
Dept: URGENT CARE | Facility: PHYSICIAN GROUP | Age: 57
End: 2017-12-27

## 2017-12-27 DIAGNOSIS — J22 LOWER RESPIRATORY INFECTION (E.G., BRONCHITIS, PNEUMONIA, PNEUMONITIS, PULMONITIS): ICD-10-CM

## 2017-12-28 ENCOUNTER — HOSPITAL ENCOUNTER (OUTPATIENT)
Dept: LAB | Facility: MEDICAL CENTER | Age: 57
End: 2017-12-28
Attending: PHYSICIAN ASSISTANT
Payer: COMMERCIAL

## 2017-12-28 ENCOUNTER — APPOINTMENT (OUTPATIENT)
Dept: RADIOLOGY | Facility: IMAGING CENTER | Age: 57
End: 2017-12-28
Attending: PHYSICIAN ASSISTANT
Payer: COMMERCIAL

## 2017-12-28 ENCOUNTER — OFFICE VISIT (OUTPATIENT)
Dept: URGENT CARE | Facility: PHYSICIAN GROUP | Age: 57
End: 2017-12-28
Payer: COMMERCIAL

## 2017-12-28 VITALS
BODY MASS INDEX: 29.08 KG/M2 | DIASTOLIC BLOOD PRESSURE: 78 MMHG | OXYGEN SATURATION: 97 % | RESPIRATION RATE: 16 BRPM | HEART RATE: 86 BPM | HEIGHT: 62 IN | SYSTOLIC BLOOD PRESSURE: 124 MMHG | WEIGHT: 158 LBS | TEMPERATURE: 98.8 F

## 2017-12-28 DIAGNOSIS — R05.9 COUGH: ICD-10-CM

## 2017-12-28 DIAGNOSIS — Z72.0 TOBACCO ABUSE: ICD-10-CM

## 2017-12-28 DIAGNOSIS — R06.02 SOB (SHORTNESS OF BREATH): ICD-10-CM

## 2017-12-28 DIAGNOSIS — R79.89 ELEVATED D-DIMER: ICD-10-CM

## 2017-12-28 LAB — DEPRECATED D DIMER PPP IA-ACNC: 253 NG/ML(D-DU)

## 2017-12-28 PROCEDURE — 71020 DX-CHEST-2 VIEWS: CPT | Mod: TC | Performed by: PHYSICIAN ASSISTANT

## 2017-12-28 PROCEDURE — 99214 OFFICE O/P EST MOD 30 MIN: CPT | Performed by: PHYSICIAN ASSISTANT

## 2017-12-28 PROCEDURE — 36415 COLL VENOUS BLD VENIPUNCTURE: CPT

## 2017-12-28 PROCEDURE — 85379 FIBRIN DEGRADATION QUANT: CPT

## 2017-12-28 RX ORDER — AZITHROMYCIN 250 MG/1
TABLET, FILM COATED ORAL
Qty: 6 TAB | Refills: 0 | OUTPATIENT
Start: 2017-12-28

## 2017-12-28 NOTE — TELEPHONE ENCOUNTER
Was the patient seen in the last year in this department? Yes     Does patient have an active prescription for medications requested? No     Received Request Via: Patient     Pt was seen 12/20/17 in [facility]. Was prescribed zithromax and completed the entire dose of medication but is still feeling the same. Would like to know if she can get a refill for one more dose of medication.   Please advise.     Thank you,

## 2017-12-28 NOTE — PROGRESS NOTES
Chief Complaint   Patient presents with   • Cough     No improvement from last visit        HISTORY OF PRESENT ILLNESS: Patient is a 57 y.o. female who presents today for continued cough, fatigue and rib pain since last visit.  Patient was seen and dx with possible pneumonia per patient, (LRTI per chart) on 12/20/17.  She was rx course of Zithromax and Medrol dose pack.  She states she has gone through all of this and is no better at all.  She states cough is still persistent.  Does not feel SOB. She does have pain with deep breaths, feels it in the right lower rib cage and she is concerned she has developed pneumonia or that it is worsening.  She is current daily smoker. Does take estrogen HRT.  No leg swelling.  No hx of blood clots.  Has felt feverish but has not had measured fevers in last several days. Initially had productive cough with hemoptysis. But that has resolved.  OTC not helping either.     Patient Active Problem List    Diagnosis Date Noted   • EBV infection 05/17/2016   • Sleep apnea 05/17/2016   • Overweight 05/17/2016   • Dyslipidemia 05/17/2016   • Vitamin D deficiency 05/17/2016   • Tobacco abuse 05/17/2016   • Postmenopausal status 05/17/2016   • Essential hypertension 10/09/2015   • Postmenopausal HRT (hormone replacement therapy) 09/09/2014       Allergies:Clindamycin hcl; Nitrofurantoin; Penicillins; and Sulfa drugs    Current Outpatient Prescriptions Ordered in [facility]   Medication Sig Dispense Refill   • benazepril-hydrochlorthiazide (LOTENSIN HCT) 20-12.5 MG per tablet TAKE 1 TAB BY MOUTH EVERY DAY. 30 Tab 0   • estradiol (ESTRACE) 1 MG Tab TAKE 1 TAB BY MOUTH EVERY DAY. 30 Tab 0   • albuterol 108 (90 Base) MCG/ACT Aero Soln inhalation aerosol Inhale 2 Puffs by mouth every four hours as needed for Shortness of Breath. 1 Inhaler 0   • diphenhydrAMINE (BENADRYL ALLERGY) 25 MG Tab Take 25 mg by mouth every 6 hours as needed for Sleep.     • Loratadine (CLARITIN PO) Take  by mouth.       No  "current [facility]-ordered facility-administered medications on file.        Past Medical History:   Diagnosis Date   • CAP (community acquired pneumonia)    • EBV infection 2016   • Hypertension    • Kidney disease    • Postmenopausal HRT (hormone replacement therapy)    • Shingles     L SHOULDER   • Vitamin D deficiency 2016       Social History   Substance Use Topics   • Smoking status: Current Every Day Smoker     Packs/day: 0.50     Types: Cigarettes   • Smokeless tobacco: Never Used      Comment: 1-2 day, intermittent use   • Alcohol use No       Family Status   Relation Status   • Mother    • Father      Family History   Problem Relation Age of Onset   • Dementia Mother    • Arthritis Mother    • Heart Attack Father    • Heart Disease Father        ROS:  Review of Systems   Constitutional: SEE HPI  HENT: Negative for ear pain, nosebleeds, congestion, sore throat and neck pain.    Eyes: Negative for blurred vision.   Respiratory: SEE HPI  Cardiovascular: Negative for chest pain, palpitations, orthopnea and leg swelling.   Gastrointestinal: Negative for heartburn, nausea, vomiting and abdominal pain.   All other systems reviewed and are negative.       Exam:  Blood pressure 124/78, pulse 86, temperature 37.1 °C (98.8 °F), resp. rate 16, height 1.575 m (5' 2\"), weight 71.7 kg (158 lb), SpO2 97 %.  General:  Well nourished, well developed female in NAD  Eyes: PERRLA, EOM within normal limits, no conjunctival injection, no scleral icterus, visual fields and acuity grossly intact.  Ears: Normal shape and symmetry, no tenderness, no discharge. External canals are without any significant edema or erythema. Tympanic membranes are without any inflammation, no effusion. Gross auditory acuity is intact  Nose: Symmetrical, sinuses without tenderness, no discharge.   Mouth: reasonable hygiene, no erythema exudates or tonsillar enlargement.  Neck: no masses, range of motion within normal limits, no " tracheal deviation. No lymphadenopathy  Pulmonary: Normal respiratory effort, no wheezes, crackles, or rhonchi.  She is moderately TTP in area of pain along posterior right inferior ribs.  No ecchymosis.   Cardiovascular: regular rate and rhythm without murmurs, rubs, or gallops.  Abdomen: Soft, nontender, nondistended. Normal bowel sounds. No hepatosplenomegaly or masses, or hernias. No rebound or guarding.  Skin: No visible rashes or lesion. Warm, pink, dry.   Extremities: no clubbing, cyanosis, or edema.  Neuro: A&O x 3. Speech normal/clear.  Normal gait.     RAD       Impression       No acute cardiopulmonary process is identified.   Reading Provider Reading Date   Nam Ray M.D. Dec 28, 20       Impression       1.  There is no CT evidence of acute pulmonary embolism.  2.  There is no evidence of failure, effusion, or pneumonia.     Reading Provider Reading Date   Reina Gasca M.D. Dec 29, 2017       Component Results     Component Value Ref Range & Units Status   D-Dimer Screen 253  <250 ng/mL(D-DU) Final       Assessment/Plan:  1. Cough  DX-CHEST-2 VIEWS    D-DIMER    CT-CTA CHEST PULMONARY ARTERY W/ RECONS    benzonatate (TESSALON) 200 MG capsule   2. SOB (shortness of breath)  D-DIMER    CT-CTA CHEST PULMONARY ARTERY W/ RECONS    BASIC METABOLIC PANEL   3. Tobacco abuse  D-DIMER   4. Elevated d-dimer  CT-CTA CHEST PULMONARY ARTERY W/ RECONS         -initial x ray negative.   Patient with reproducible chest wall pain.   Patient however complaining of persistent coughing, fatigue/malaise with thoracic pain, initial hemoptysis, current daily smoker and use of oral HRT, rasising concern for PE rule out.   -patient declined doing CTA initially.  Screened D-dimer.   -D-dimer mildly elevated, not significant however higher clinical concern.    -CT performed and negative as above.  Discussed results and findings with patient.  Effective pneumonia rule out and at this time no further indication for  abx  -discussed smoking cessation.    Humidifier/steamy showers.  Fluids, continued rest  -RTC and ER precautions discussed in detail, any new SOB, chest pain, exertional chest pain or SOB, etc.     Supportive care, differential diagnoses, and indications for immediate follow-up discussed with patient.   Pathogenesis of diagnosis discussed including typical length and natural progression.   Instructed to return to clinic or nearest emergency department for any change in condition, further concerns, or worsening of symptoms.  Patient states understanding of the plan of care and discharge instructions.  Instructed to make an appointment, for follow up, with their primary care provider.      Rachael Genao P.A.-C.

## 2017-12-29 ENCOUNTER — HOSPITAL ENCOUNTER (OUTPATIENT)
Dept: RADIOLOGY | Facility: MEDICAL CENTER | Age: 57
End: 2017-12-29
Attending: PHYSICIAN ASSISTANT
Payer: COMMERCIAL

## 2017-12-29 DIAGNOSIS — R05.9 COUGH: ICD-10-CM

## 2017-12-29 DIAGNOSIS — R79.89 ELEVATED D-DIMER: ICD-10-CM

## 2017-12-29 DIAGNOSIS — R06.02 SOB (SHORTNESS OF BREATH): ICD-10-CM

## 2017-12-29 PROCEDURE — 71275 CT ANGIOGRAPHY CHEST: CPT

## 2017-12-29 RX ORDER — BENZONATATE 200 MG/1
200 CAPSULE ORAL 3 TIMES DAILY PRN
Qty: 30 CAP | Refills: 0 | Status: SHIPPED | OUTPATIENT
Start: 2017-12-29 | End: 2018-11-08

## 2018-09-18 ENCOUNTER — OFFICE VISIT (OUTPATIENT)
Dept: URGENT CARE | Facility: PHYSICIAN GROUP | Age: 58
End: 2018-09-18
Payer: COMMERCIAL

## 2018-09-18 VITALS
DIASTOLIC BLOOD PRESSURE: 66 MMHG | TEMPERATURE: 99.1 F | HEART RATE: 68 BPM | HEIGHT: 62 IN | OXYGEN SATURATION: 99 % | SYSTOLIC BLOOD PRESSURE: 104 MMHG | WEIGHT: 159 LBS | BODY MASS INDEX: 29.26 KG/M2

## 2018-09-18 DIAGNOSIS — J01.00 ACUTE NON-RECURRENT MAXILLARY SINUSITIS: ICD-10-CM

## 2018-09-18 PROCEDURE — 99214 OFFICE O/P EST MOD 30 MIN: CPT | Performed by: PHYSICIAN ASSISTANT

## 2018-09-18 RX ORDER — AZITHROMYCIN 250 MG/1
TABLET, FILM COATED ORAL
Qty: 6 TAB | Refills: 0 | Status: SHIPPED | OUTPATIENT
Start: 2018-09-18 | End: 2018-11-08

## 2018-09-18 ASSESSMENT — ENCOUNTER SYMPTOMS
SINUS PRESSURE: 1
SHORTNESS OF BREATH: 0
SINUS PAIN: 1
COUGH: 0
SORE THROAT: 0
VOMITING: 0
FEVER: 0
CHILLS: 0
ABDOMINAL PAIN: 0
SPUTUM PRODUCTION: 0
MUSCULOSKELETAL NEGATIVE: 1
DIARRHEA: 0
DIZZINESS: 0
NAUSEA: 0

## 2018-09-18 NOTE — PROGRESS NOTES
"Subjective:      Elizabeth Madel Schwab is a 58 y.o. female who presents with Sinus Problem (Sinus pressure and pain, headache, post nasal drip, runny nose, sneezing x 2 wks )            Sinus Problem   This is a new problem. The current episode started 1 to 4 weeks ago (2-3 weeks). The problem has been rapidly worsening since onset. There has been no fever. Her pain is at a severity of 2/10. The pain is mild. Associated symptoms include congestion, sinus pressure and sneezing. Pertinent negatives include no chills, coughing, ear pain, shortness of breath or sore throat. Past treatments include saline sprays. The treatment provided mild relief.       Review of Systems   Constitutional: Negative for chills and fever.   HENT: Positive for congestion, sinus pain, sinus pressure and sneezing. Negative for ear pain and sore throat.    Respiratory: Negative for cough, sputum production and shortness of breath.    Cardiovascular: Negative for chest pain.   Gastrointestinal: Negative for abdominal pain, diarrhea, nausea and vomiting.   Genitourinary: Negative.    Musculoskeletal: Negative.    Skin: Negative for rash.   Neurological: Negative for dizziness.          Objective:     /66   Pulse 68   Temp 37.3 °C (99.1 °F)   Ht 1.575 m (5' 2\")   Wt 72.1 kg (159 lb)   SpO2 99%   BMI 29.08 kg/m²      Physical Exam   Constitutional: She is oriented to person, place, and time. She appears well-developed and well-nourished. No distress.   HENT:   Head: Normocephalic and atraumatic.   Right Ear: Hearing, tympanic membrane, external ear and ear canal normal.   Left Ear: Hearing, tympanic membrane, external ear and ear canal normal.   Nose: Mucosal edema present. Right sinus exhibits maxillary sinus tenderness. Right sinus exhibits no frontal sinus tenderness. Left sinus exhibits maxillary sinus tenderness. Left sinus exhibits no frontal sinus tenderness.   Mouth/Throat: Oropharynx is clear and moist. No oropharyngeal " exudate, posterior oropharyngeal edema or posterior oropharyngeal erythema.   Tongue darkly discolored    Eyes: Pupils are equal, round, and reactive to light. Conjunctivae are normal. Right eye exhibits no discharge. Left eye exhibits no discharge.   Cardiovascular: Normal rate, regular rhythm and normal heart sounds.    No murmur heard.  Pulmonary/Chest: Effort normal and breath sounds normal. No respiratory distress. She has no wheezes. She has no rales.   Musculoskeletal: Normal range of motion.   Neurological: She is alert and oriented to person, place, and time.   Skin: Skin is warm and dry. She is not diaphoretic.   Psychiatric: She has a normal mood and affect. Her behavior is normal.   Nursing note and vitals reviewed.         PMH:  has a past medical history of CAP (community acquired pneumonia); EBV infection (5/17/2016); Hypertension; Kidney disease; Postmenopausal HRT (hormone replacement therapy); Shingles; and Vitamin D deficiency (5/17/2016).  MEDS:   Current Outpatient Prescriptions:   •  azithromycin (ZITHROMAX) 250 MG Tab, Take 2 tablets on day one, then 1 tablet on days two through five, Disp: 6 Tab, Rfl: 0  •  Loratadine (CLARITIN PO), Take  by mouth., Disp: , Rfl:   •  benazepril-hydrochlorthiazide (LOTENSIN HCT) 20-12.5 MG per tablet, TAKE 1 TAB BY MOUTH EVERY DAY., Disp: 30 Tab, Rfl: 0  •  estradiol (ESTRACE) 1 MG Tab, TAKE 1 TAB BY MOUTH EVERY DAY., Disp: 30 Tab, Rfl: 0  •  benzonatate (TESSALON) 200 MG capsule, Take 1 Cap by mouth 3 times a day as needed., Disp: 30 Cap, Rfl: 0  •  albuterol 108 (90 Base) MCG/ACT Aero Soln inhalation aerosol, Inhale 2 Puffs by mouth every four hours as needed for Shortness of Breath., Disp: 1 Inhaler, Rfl: 0  •  diphenhydrAMINE (BENADRYL ALLERGY) 25 MG Tab, Take 25 mg by mouth every 6 hours as needed for Sleep., Disp: , Rfl:   ALLERGIES:   Allergies   Allergen Reactions   • Clindamycin Hcl Itching and Swelling     Hands and feet    • Nitrofurantoin    •  Penicillins    • Sulfa Drugs      SURGHX:   Past Surgical History:   Procedure Laterality Date   • ABDOMINAL HYSTERECTOMY TOTAL       SOCHX:  reports that she has been smoking Cigarettes.  She has been smoking about 0.50 packs per day. She has never used smokeless tobacco. She reports that she does not drink alcohol or use drugs.  FH: family history includes Arthritis in her mother; Dementia in her mother; Heart Attack in her father; Heart Disease in her father.       Assessment/Plan:     1. Acute non-recurrent maxillary sinusitis  - azithromycin (ZITHROMAX) 250 MG Tab; Take 2 tablets on day one, then 1 tablet on days two through five  Dispense: 6 Tab; Refill: 0    Discussed use of nedi-pot, humidifier, and Flonase nasal spray for symptomatic relief. Call or return to office if symptoms persist or worsen. The patient demonstrated a good understanding and agreed with the treatment plan.

## 2018-11-08 ENCOUNTER — OFFICE VISIT (OUTPATIENT)
Dept: URGENT CARE | Facility: PHYSICIAN GROUP | Age: 58
End: 2018-11-08
Payer: COMMERCIAL

## 2018-11-08 VITALS
HEIGHT: 62 IN | OXYGEN SATURATION: 99 % | SYSTOLIC BLOOD PRESSURE: 130 MMHG | BODY MASS INDEX: 29.26 KG/M2 | DIASTOLIC BLOOD PRESSURE: 66 MMHG | HEART RATE: 83 BPM | TEMPERATURE: 98.7 F | WEIGHT: 159 LBS

## 2018-11-08 DIAGNOSIS — H69.92 ETD (EUSTACHIAN TUBE DYSFUNCTION), LEFT: ICD-10-CM

## 2018-11-08 DIAGNOSIS — J01.00 ACUTE NON-RECURRENT MAXILLARY SINUSITIS: ICD-10-CM

## 2018-11-08 PROCEDURE — 99214 OFFICE O/P EST MOD 30 MIN: CPT | Performed by: PHYSICIAN ASSISTANT

## 2018-11-08 RX ORDER — KETOROLAC TROMETHAMINE 30 MG/ML
60 INJECTION, SOLUTION INTRAMUSCULAR; INTRAVENOUS ONCE
Status: COMPLETED | OUTPATIENT
Start: 2018-11-08 | End: 2018-11-08

## 2018-11-08 RX ORDER — DOXYCYCLINE HYCLATE 100 MG
100 TABLET ORAL 2 TIMES DAILY
Qty: 20 TAB | Refills: 0 | Status: SHIPPED | OUTPATIENT
Start: 2018-11-08 | End: 2019-01-02

## 2018-11-08 RX ADMIN — KETOROLAC TROMETHAMINE 60 MG: 30 INJECTION, SOLUTION INTRAMUSCULAR; INTRAVENOUS at 08:28

## 2018-11-08 NOTE — PROGRESS NOTES
Chief Complaint   Patient presents with   • Earache     L ear pain, headache, dizziness, nausea, x6 weeks        HISTORY OF PRESENT ILLNESS: Patient is a 58 y.o. female who presents today for about 6 weeks of waxing and waning sinus congestion, left ear pain. Patient was seen about 6 weeks ago, had Z pack for sinus symptoms, got better but then worse again.  Saw her PCP who gave her Kenalog, Doxy.  She felt the Kenalog did not help but Doxy did to some extent.  She is compliant on her Claritin. She feels that her sinus symptoms are usually worse on the left.  She feels dizzy on and off, nauseated from this.  Headache and left ear pain was worse last night and Ibuprofen did not help (took 400 mg)     Saline does help the sinuses.     Patient Active Problem List    Diagnosis Date Noted   • EBV infection 05/17/2016   • Sleep apnea 05/17/2016   • Overweight 05/17/2016   • Dyslipidemia 05/17/2016   • Vitamin D deficiency 05/17/2016   • Tobacco abuse 05/17/2016   • Postmenopausal status 05/17/2016   • Essential hypertension 10/09/2015   • Postmenopausal HRT (hormone replacement therapy) 09/09/2014       Allergies:Clindamycin hcl; Nitrofurantoin; Penicillins; and Sulfa drugs    Current Outpatient Prescriptions Ordered in [facility]   Medication Sig Dispense Refill   • doxycycline (VIBRAMYCIN) 100 MG Tab Take 1 Tab by mouth 2 times a day. 20 Tab 0   • diphenhydrAMINE (BENADRYL ALLERGY) 25 MG Tab Take 25 mg by mouth every 6 hours as needed for Sleep.     • Loratadine (CLARITIN PO) Take  by mouth.     • benazepril-hydrochlorthiazide (LOTENSIN HCT) 20-12.5 MG per tablet TAKE 1 TAB BY MOUTH EVERY DAY. 30 Tab 0   • estradiol (ESTRACE) 1 MG Tab TAKE 1 TAB BY MOUTH EVERY DAY. 30 Tab 0   • albuterol 108 (90 Base) MCG/ACT Aero Soln inhalation aerosol Inhale 2 Puffs by mouth every four hours as needed for Shortness of Breath. 1 Inhaler 0     Current Facility-Administered Medications Ordered in Epic   Medication Dose Route Frequency  "Provider Last Rate Last Dose   • ketorolac (TORADOL) injection 60 mg  60 mg Intramuscular Once Rachael Genao P.A.-C.           Past Medical History:   Diagnosis Date   • CAP (community acquired pneumonia)    • EBV infection 2016   • Hypertension    • Kidney disease    • Postmenopausal HRT (hormone replacement therapy)    • Shingles     L SHOULDER   • Vitamin D deficiency 2016       Social History   Substance Use Topics   • Smoking status: Current Every Day Smoker     Packs/day: 0.50     Types: Cigarettes   • Smokeless tobacco: Never Used      Comment: 1-2 day, intermittent use   • Alcohol use No       Family Status   Relation Status   • Mo    • Fa      Family History   Problem Relation Age of Onset   • Dementia Mother    • Arthritis Mother    • Heart Attack Father    • Heart Disease Father        ROS:  Review of Systems   Constitutional: SEE HPI  HENT: SEE HPI  Eyes: Negative for blurred vision.   Respiratory: SEE HPI  Cardiovascular: Negative for chest pain, palpitations, orthopnea and leg swelling.   Gastrointestinal: Negative for heartburn, nausea, vomiting and abdominal pain.   Genitourinary: Negative for dysuria, urgency and frequency.     Exam:  Blood pressure 130/66, pulse 83, temperature 37.1 °C (98.7 °F), temperature source Temporal, height 1.575 m (5' 2\"), weight 72.1 kg (159 lb), SpO2 99 %, not currently breastfeeding.  General:  Well nourished, well developed female in NAD  Eyes: PERRLA, EOM within normal limits, no conjunctival injection, no scleral icterus, visual fields and acuity grossly intact.  Ears: Normal shape and symmetry, no tenderness, no discharge. External canals are without any significant edema or erythema. Tympanic membranes are without any inflammation, no effusion. Gross auditory acuity is intact.  No mastoid tenderness, no periauricular lymphadenopathy or tenderness  Nose: Symmetrical, left maxillary sinus TTP, mild swelling, boggy turbinates.   Mouth: " reasonable hygiene, no erythema exudates or tonsillar enlargement.  Neck: no masses, range of motion within normal limits, no tracheal deviation. No lymphadenopathy  Pulmonary: Normal respiratory effort, no wheezes, crackles, or rhonchi.  Cardiovascular: regular rate and rhythm without murmurs, rubs, or gallops.  Skin: No visible rashes or lesion. Warm, pink, dry.   Extremities: no clubbing, cyanosis, or edema.  Neuro: A&O x 3.  CN 2-12 grossly intact.  Motor strength upper and lower full and intact bilaterally.  Speech normal/clear.  Normal coordination. Normal gait.         Assessment/Plan:  1. Acute non-recurrent maxillary sinusitis  doxycycline (VIBRAMYCIN) 100 MG Tab    REFERRAL TO ENT    ketorolac (TORADOL) injection 60 mg   2. ETD (Eustachian tube dysfunction), left           -continued sinus care discussed.  Saline irrigation.     -recommend trial of Nasacort (she states she did not like Flonase), switch to Allegra.   -Toradol shot given in clinic for pain, patient tolerated well.  Monitored for 10 mins s/p shot.   -ENT referral   -RTC precautions.  ER precautions regarding headaches, dizziness, etc discussedd      Supportive care, differential diagnoses, and indications for immediate follow-up discussed with patient.   Pathogenesis of diagnosis discussed including typical length and natural progression.   Instructed to return to clinic or nearest emergency department for any change in condition, further concerns, or worsening of symptoms.  Patient states understanding of the plan of care and discharge instructions.        Rachael Genao P.A.-C.

## 2018-11-14 ENCOUNTER — TELEPHONE (OUTPATIENT)
Dept: URGENT CARE | Facility: PHYSICIAN GROUP | Age: 58
End: 2018-11-14

## 2018-11-14 NOTE — TELEPHONE ENCOUNTER
Patient was referred to [facility] and she would like to be referred to [facility] instead. Please advise.

## 2019-01-02 ENCOUNTER — OFFICE VISIT (OUTPATIENT)
Dept: URGENT CARE | Facility: PHYSICIAN GROUP | Age: 59
End: 2019-01-02
Payer: COMMERCIAL

## 2019-01-02 VITALS
RESPIRATION RATE: 16 BRPM | HEIGHT: 62 IN | HEART RATE: 84 BPM | WEIGHT: 153 LBS | TEMPERATURE: 97.5 F | DIASTOLIC BLOOD PRESSURE: 88 MMHG | BODY MASS INDEX: 28.16 KG/M2 | SYSTOLIC BLOOD PRESSURE: 138 MMHG | OXYGEN SATURATION: 95 %

## 2019-01-02 DIAGNOSIS — J06.9 UPPER RESPIRATORY TRACT INFECTION, UNSPECIFIED TYPE: ICD-10-CM

## 2019-01-02 DIAGNOSIS — I10 ESSENTIAL HYPERTENSION: ICD-10-CM

## 2019-01-02 PROCEDURE — 99214 OFFICE O/P EST MOD 30 MIN: CPT | Performed by: FAMILY MEDICINE

## 2019-01-02 RX ORDER — DEXTROMETHORPHAN HYDROBROMIDE AND PROMETHAZINE HYDROCHLORIDE 15; 6.25 MG/5ML; MG/5ML
5 SYRUP ORAL 4 TIMES DAILY PRN
Qty: 140 ML | Refills: 0 | Status: SHIPPED | OUTPATIENT
Start: 2019-01-02 | End: 2020-09-25

## 2019-01-02 RX ORDER — DOXYCYCLINE HYCLATE 100 MG
100 TABLET ORAL 2 TIMES DAILY
Qty: 14 TAB | Refills: 0 | Status: SHIPPED | OUTPATIENT
Start: 2019-01-02 | End: 2019-01-09

## 2019-01-02 ASSESSMENT — ENCOUNTER SYMPTOMS
MYALGIAS: 0
EYE PAIN: 0
VOMITING: 0
NAUSEA: 0
CONSTIPATION: 0
SHORTNESS OF BREATH: 0
SORE THROAT: 0
DIARRHEA: 0
ABDOMINAL PAIN: 0
FEVER: 0
HEADACHES: 0
SINUS PAIN: 0
EYE DISCHARGE: 0
SPUTUM PRODUCTION: 1
COUGH: 1
CHILLS: 0

## 2019-01-02 NOTE — PROGRESS NOTES
Subjective:   Elizabeth Madel Schwab is a 58 y.o. female who presents for Cough (10 days )       Cough   This is a new problem. The current episode started 1 to 4 weeks ago. The problem has been gradually worsening. The problem occurs every few minutes. The cough is productive of sputum. Pertinent negatives include no chest pain, chills, ear congestion, ear pain, fever, headaches, myalgias, nasal congestion, sore throat or shortness of breath. The symptoms are aggravated by lying down. Treatments tried: Robitussin, Theraflu. The treatment provided mild relief. Her past medical history is significant for bronchitis.     Review of Systems   Constitutional: Negative for chills and fever.   HENT: Negative for congestion, ear discharge, ear pain, sinus pain and sore throat.    Eyes: Negative for pain and discharge.   Respiratory: Positive for cough and sputum production. Negative for shortness of breath.    Cardiovascular: Negative for chest pain.   Gastrointestinal: Negative for abdominal pain, constipation, diarrhea, nausea and vomiting.   Musculoskeletal: Negative for myalgias.   Neurological: Negative for headaches.   All other systems reviewed and are negative.    Pt has h/o htn stabel on meds not chest pain or sob    PMH:  has a past medical history of CAP (community acquired pneumonia); EBV infection (5/17/2016); Hypertension; Kidney disease; Postmenopausal HRT (hormone replacement therapy); Shingles; and Vitamin D deficiency (5/17/2016).    MEDS:   Current Outpatient Prescriptions:   •  promethazine-dextromethorphan (PROMETHAZINE-DM) 6.25-15 MG/5ML syrup, Take 5 mL by mouth 4 times a day as needed for Cough., Disp: 140 mL, Rfl: 0  •  doxycycline (VIBRAMYCIN) 100 MG Tab, Take 1 Tab by mouth 2 times a day for 7 days., Disp: 14 Tab, Rfl: 0  •  benazepril-hydrochlorthiazide (LOTENSIN HCT) 20-12.5 MG per tablet, TAKE 1 TAB BY MOUTH EVERY DAY., Disp: 30 Tab, Rfl: 0  •  estradiol (ESTRACE) 1 MG Tab, TAKE 1 TAB BY MOUTH  "EVERY DAY., Disp: 30 Tab, Rfl: 0  •  albuterol 108 (90 Base) MCG/ACT Aero Soln inhalation aerosol, Inhale 2 Puffs by mouth every four hours as needed for Shortness of Breath., Disp: 1 Inhaler, Rfl: 0  •  diphenhydrAMINE (BENADRYL ALLERGY) 25 MG Tab, Take 25 mg by mouth every 6 hours as needed for Sleep., Disp: , Rfl:   •  Loratadine (CLARITIN PO), Take  by mouth., Disp: , Rfl:     ALLERGIES:   Allergies   Allergen Reactions   • Clindamycin Hcl Itching and Swelling     Hands and feet    • Nitrofurantoin    • Penicillins    • Sulfa Drugs        SURGHX:   Past Surgical History:   Procedure Laterality Date   • ABDOMINAL HYSTERECTOMY TOTAL         SOCHX:  reports that she has been smoking Cigarettes.  She has been smoking about 0.50 packs per day. She has never used smokeless tobacco. She reports that she does not drink alcohol or use drugs.    FH: Reviewed with patient, not pertinent to this visit.     Objective:   /88   Pulse 84   Temp 36.4 °C (97.5 °F) (Temporal)   Resp 16   Ht 1.575 m (5' 2\")   Wt 69.4 kg (153 lb)   SpO2 95%   BMI 27.98 kg/m²   Physical Exam   Constitutional: She is oriented to person, place, and time. She appears well-developed and well-nourished. No distress.   HENT:   Head: Normocephalic and atraumatic.   Right Ear: Tympanic membrane, external ear and ear canal normal.   Left Ear: Tympanic membrane, external ear and ear canal normal.   Nose: Nose normal.   Mouth/Throat: Uvula is midline, oropharynx is clear and moist and mucous membranes are normal.   Eyes: Conjunctivae and EOM are normal.   Neck: Normal range of motion. No tracheal deviation present.   Cardiovascular: Normal rate, regular rhythm and normal heart sounds.    Pulmonary/Chest: Effort normal and breath sounds normal. No respiratory distress. She has no wheezes. She has no rhonchi. She has no rales.   Musculoskeletal:   Full ROM in all four extremities   Lymphadenopathy:     She has no cervical adenopathy.   Neurological: " She is alert and oriented to person, place, and time.   Skin: Skin is warm and dry.   Psychiatric: She has a normal mood and affect. Her behavior is normal. Judgment and thought content normal.       Assessment/Plan:     1. Upper respiratory tract infection, unspecified type  promethazine-dextromethorphan (PROMETHAZINE-DM) 6.25-15 MG/5ML syrup    doxycycline (VIBRAMYCIN) 100 MG Tab   2. Essential hypertension     Chronic conditions of  hypertension that may potentially impact the patient's ability to recover from this condition appear stable. The patient will f/u with their pcp and continue with current chronic medications as directed.      - Advised to try OTC Mucinex for symptom relief  - Contingent antibiotic prescription given to patient to fill upon meeting criteria of guidelines discussed.   - Advised to return if symptoms worsen or do not improve    Differential diagnosis, natural history, supportive care, and indications for immediate follow-up discussed.  Patient's case was reviewed and discussed with Dr. Clarke during Peter MONCADA's training period.

## 2019-03-04 ENCOUNTER — APPOINTMENT (OUTPATIENT)
Dept: RADIOLOGY | Facility: IMAGING CENTER | Age: 59
End: 2019-03-04
Attending: NURSE PRACTITIONER
Payer: COMMERCIAL

## 2019-03-04 ENCOUNTER — OFFICE VISIT (OUTPATIENT)
Dept: URGENT CARE | Facility: PHYSICIAN GROUP | Age: 59
End: 2019-03-04
Payer: COMMERCIAL

## 2019-03-04 ENCOUNTER — HOSPITAL ENCOUNTER (OUTPATIENT)
Facility: MEDICAL CENTER | Age: 59
End: 2019-03-04
Attending: NURSE PRACTITIONER
Payer: COMMERCIAL

## 2019-03-04 VITALS
HEART RATE: 87 BPM | DIASTOLIC BLOOD PRESSURE: 70 MMHG | TEMPERATURE: 99 F | HEIGHT: 62 IN | WEIGHT: 160 LBS | OXYGEN SATURATION: 97 % | SYSTOLIC BLOOD PRESSURE: 104 MMHG | BODY MASS INDEX: 29.44 KG/M2

## 2019-03-04 DIAGNOSIS — R10.9 RIGHT FLANK PAIN: ICD-10-CM

## 2019-03-04 DIAGNOSIS — R10.9 ABDOMINAL DISCOMFORT: ICD-10-CM

## 2019-03-04 LAB
APPEARANCE UR: CLEAR
BILIRUB UR STRIP-MCNC: NORMAL MG/DL
COLOR UR AUTO: YELLOW
GLUCOSE UR STRIP.AUTO-MCNC: NORMAL MG/DL
KETONES UR STRIP.AUTO-MCNC: NORMAL MG/DL
LEUKOCYTE ESTERASE UR QL STRIP.AUTO: NORMAL
NITRITE UR QL STRIP.AUTO: NORMAL
PH UR STRIP.AUTO: 5.5 [PH] (ref 5–8)
PROT UR QL STRIP: NORMAL MG/DL
RBC UR QL AUTO: NORMAL
SP GR UR STRIP.AUTO: 1.02
UROBILINOGEN UR STRIP-MCNC: 0.2 MG/DL

## 2019-03-04 PROCEDURE — 74018 RADEX ABDOMEN 1 VIEW: CPT | Mod: 26 | Performed by: NURSE PRACTITIONER

## 2019-03-04 PROCEDURE — 99214 OFFICE O/P EST MOD 30 MIN: CPT | Performed by: NURSE PRACTITIONER

## 2019-03-04 PROCEDURE — 81002 URINALYSIS NONAUTO W/O SCOPE: CPT | Performed by: NURSE PRACTITIONER

## 2019-03-04 PROCEDURE — 87086 URINE CULTURE/COLONY COUNT: CPT

## 2019-03-04 RX ORDER — KETOROLAC TROMETHAMINE 30 MG/ML
30 INJECTION, SOLUTION INTRAMUSCULAR; INTRAVENOUS ONCE
Status: COMPLETED | OUTPATIENT
Start: 2019-03-04 | End: 2019-03-04

## 2019-03-04 RX ADMIN — KETOROLAC TROMETHAMINE 30 MG: 30 INJECTION, SOLUTION INTRAMUSCULAR; INTRAVENOUS at 08:35

## 2019-03-04 ASSESSMENT — ENCOUNTER SYMPTOMS
FLANK PAIN: 1
FEVER: 0
VOMITING: 1
ABDOMINAL PAIN: 1

## 2019-03-04 NOTE — PROGRESS NOTES
Subjective:      Elizabeth Madel Schwab is a 58 y.o. female who presents with Low Back Pain (Lower abdominal pressure, nausea, burning and aching x 3 days )            Abdominal Pain   This is a new problem. Episode onset: pt reports new onset of lower abdominal pain with associated righ tflank pain that developed 2 days ago. She states the pain was much worse on the first day. She also vomited once and has felt chilled and clammy. Reports hx of renal stone several years ago. The onset quality is sudden. The problem occurs constantly. The problem has been gradually improving (pt states after the intense pain on the first day, she noticed small black dots in the toilet after urinating. Since then the pain is slowly improving). The pain is located in the right flank and suprapubic region. The pain is moderate. The quality of the pain is aching. Associated symptoms include vomiting (once). Pertinent negatives include no fever. Associated symptoms comments: Reports she feels burning in the suprapubic region almost all the time, not necessarily associated with urination. Admits her bowel movements have been normal. Pt reports this feels similar to a pervious kidney stone she had. The pain is relieved by certain positions. Treatments tried: ibuprofen. The treatment provided mild relief. There is no history of pancreatitis or ulcerative colitis.       Review of Systems   Constitutional: Negative for fever.   Gastrointestinal: Positive for abdominal pain and vomiting (once).   Genitourinary: Positive for flank pain (right).   All other systems reviewed and are negative.    Past Medical History:   Diagnosis Date   • CAP (community acquired pneumonia)    • EBV infection 5/17/2016   • Hypertension    • Kidney disease    • Postmenopausal HRT (hormone replacement therapy)    • Shingles     L SHOULDER   • Vitamin D deficiency 5/17/2016      Past Surgical History:   Procedure Laterality Date   • ABDOMINAL HYSTERECTOMY TOTAL       "  Social History     Social History   • Marital status:      Spouse name: N/A   • Number of children: N/A   • Years of education: N/A     Occupational History   • Not on file.     Social History Main Topics   • Smoking status: Current Every Day Smoker     Packs/day: 0.50     Types: Cigarettes   • Smokeless tobacco: Never Used      Comment: 1-2 day, intermittent use   • Alcohol use No   • Drug use: No   • Sexual activity: Yes     Partners: Male     Other Topics Concern   • Not on file     Social History Narrative   • No narrative on file          Objective:     /70 (BP Location: Right arm, Patient Position: Sitting, BP Cuff Size: Adult)   Pulse 87   Temp 37.2 °C (99 °F) (Temporal)   Ht 1.575 m (5' 2\")   Wt 72.6 kg (160 lb)   SpO2 97%   BMI 29.26 kg/m²      Physical Exam   Constitutional: She is oriented to person, place, and time. Vital signs are normal. She appears well-developed and well-nourished.   HENT:   Head: Normocephalic and atraumatic.   Eyes: Pupils are equal, round, and reactive to light. EOM are normal.   Neck: Normal range of motion.   Cardiovascular: Normal rate and regular rhythm.    Pulmonary/Chest: Effort normal.   Abdominal: Soft. Normal appearance and bowel sounds are normal. There is tenderness in the suprapubic area. There is CVA tenderness (right). There is no rigidity and no guarding.   Musculoskeletal: Normal range of motion.   Neurological: She is alert and oriented to person, place, and time.   Skin: Skin is warm and dry. Capillary refill takes less than 2 seconds.   Psychiatric: She has a normal mood and affect. Her speech is normal and behavior is normal. Thought content normal.   Vitals reviewed.            Xray: no acute process by my read. Radiology review pending.  3/4/2019 8:40 AM    HISTORY/REASON FOR EXAM:  Abdominal Pain.      TECHNIQUE/EXAM DESCRIPTION AND NUMBER OF VIEWS:  1 view(s) of the abdomen.    COMPARISON: None    FINDINGS:  AP view the abdomen " demonstrates a nonobstructive bowel gas pattern. There are arterial calcifications. Phleboliths are in the pelvis. No suspicious bony lesions.   Impression       No acute findings.     Lab Results   Component Value Date/Time    POCCOLOR YELLOW 03/04/2019 10:15 AM    POCAPPEAR CLEAR 03/04/2019 10:15 AM    POCLEUKEST NEG 03/04/2019 10:15 AM    POCNITRITE NEG 03/04/2019 10:15 AM    POCUROBILIGE 0.2 03/04/2019 10:15 AM    POCPROTEIN NEG 03/04/2019 10:15 AM    POCURPH 5.5 03/04/2019 10:15 AM    POCBLOOD NEG 03/04/2019 10:15 AM    POCSPGRV 1.020 03/04/2019 10:15 AM    POCKETONES NEG 03/04/2019 10:15 AM    POCBILIRUBIN NEG 03/04/2019 10:15 AM    POCGLUCUA NEG 03/04/2019 10:15 AM        Assessment/Plan:     1. Right flank pain  - ketorolac (TORADOL) injection 30 mg; 1 mL by Intramuscular route Once.  - POCT Urinalysis  - URINE CULTURE(NEW); Future    2. Abdominal discomfort  - DA-FAEINEF-6 VIEW; Future    Discussed with patient there is a chance she may have already passed the stone given the nature of her pain is improving  Mild pain improvement post toradol  Will culture urine to further assess for bacterial presence  Continue ibuprofen 600 mg TID for another 4 days  Will call when urine cx results are available  Strict ER precautions for worsening condition  Supportive care, differential diagnoses, and indications for immediate follow-up discussed with patient.    Pathogenesis of diagnosis discussed including typical length and natural progression.      Instructed to return to  or nearest emergency department if symptoms fail to improve, for any change in condition, further concerns, or new concerning symptoms.  Patient states understanding of the plan of care and discharge instructions.

## 2019-03-06 LAB
BACTERIA UR CULT: NORMAL
SIGNIFICANT IND 70042: NORMAL
SITE SITE: NORMAL
SOURCE SOURCE: NORMAL

## 2019-05-25 ENCOUNTER — HOSPITAL ENCOUNTER (OUTPATIENT)
Dept: LAB | Facility: MEDICAL CENTER | Age: 59
End: 2019-05-25
Attending: FAMILY MEDICINE
Payer: COMMERCIAL

## 2019-05-25 LAB
ALBUMIN SERPL BCP-MCNC: 4.1 G/DL (ref 3.2–4.9)
ALBUMIN/GLOB SERPL: 1.5 G/DL
ALP SERPL-CCNC: 71 U/L (ref 30–99)
ALT SERPL-CCNC: 14 U/L (ref 2–50)
ANION GAP SERPL CALC-SCNC: 7 MMOL/L (ref 0–11.9)
AST SERPL-CCNC: 16 U/L (ref 12–45)
BILIRUB SERPL-MCNC: 0.9 MG/DL (ref 0.1–1.5)
BUN SERPL-MCNC: 19 MG/DL (ref 8–22)
CALCIUM SERPL-MCNC: 9.1 MG/DL (ref 8.5–10.5)
CHLORIDE SERPL-SCNC: 99 MMOL/L (ref 96–112)
CHOLEST SERPL-MCNC: 215 MG/DL (ref 100–199)
CO2 SERPL-SCNC: 30 MMOL/L (ref 20–33)
CREAT SERPL-MCNC: 0.74 MG/DL (ref 0.5–1.4)
CREAT UR-MCNC: 158.6 MG/DL
FASTING STATUS PATIENT QL REPORTED: NORMAL
GLOBULIN SER CALC-MCNC: 2.7 G/DL (ref 1.9–3.5)
GLUCOSE SERPL-MCNC: 94 MG/DL (ref 65–99)
HDLC SERPL-MCNC: 64 MG/DL
LDLC SERPL CALC-MCNC: 135 MG/DL
MICROALBUMIN UR-MCNC: <0.7 MG/DL
MICROALBUMIN/CREAT UR: NORMAL MG/G (ref 0–30)
POTASSIUM SERPL-SCNC: 4.2 MMOL/L (ref 3.6–5.5)
PROT SERPL-MCNC: 6.8 G/DL (ref 6–8.2)
SODIUM SERPL-SCNC: 136 MMOL/L (ref 135–145)
TRIGL SERPL-MCNC: 81 MG/DL (ref 0–149)

## 2019-05-25 PROCEDURE — 36415 COLL VENOUS BLD VENIPUNCTURE: CPT

## 2019-05-25 PROCEDURE — 82570 ASSAY OF URINE CREATININE: CPT

## 2019-05-25 PROCEDURE — 80053 COMPREHEN METABOLIC PANEL: CPT

## 2019-05-25 PROCEDURE — 82043 UR ALBUMIN QUANTITATIVE: CPT

## 2019-05-25 PROCEDURE — 80061 LIPID PANEL: CPT

## 2019-11-12 ENCOUNTER — OFFICE VISIT (OUTPATIENT)
Dept: URGENT CARE | Facility: PHYSICIAN GROUP | Age: 59
End: 2019-11-12
Payer: COMMERCIAL

## 2019-11-12 VITALS
RESPIRATION RATE: 16 BRPM | HEIGHT: 62 IN | HEART RATE: 73 BPM | WEIGHT: 159 LBS | OXYGEN SATURATION: 95 % | TEMPERATURE: 97.6 F | SYSTOLIC BLOOD PRESSURE: 138 MMHG | DIASTOLIC BLOOD PRESSURE: 76 MMHG | BODY MASS INDEX: 29.26 KG/M2

## 2019-11-12 DIAGNOSIS — J01.40 ACUTE NON-RECURRENT PANSINUSITIS: ICD-10-CM

## 2019-11-12 PROCEDURE — 99214 OFFICE O/P EST MOD 30 MIN: CPT | Performed by: PHYSICIAN ASSISTANT

## 2019-11-12 RX ORDER — AZITHROMYCIN 250 MG/1
TABLET, FILM COATED ORAL
Qty: 6 TAB | Refills: 0 | Status: SHIPPED | OUTPATIENT
Start: 2019-11-12 | End: 2020-09-25

## 2019-11-12 ASSESSMENT — ENCOUNTER SYMPTOMS
DIZZINESS: 0
NECK PAIN: 0
NAUSEA: 0
CHILLS: 1
SORE THROAT: 0
COUGH: 0
SINUS PAIN: 1
HEADACHES: 1
MYALGIAS: 1
VOMITING: 0
FEVER: 1
EYE DISCHARGE: 0
EYE REDNESS: 0
ABDOMINAL PAIN: 0
SHORTNESS OF BREATH: 0
SPUTUM PRODUCTION: 1
EYE PAIN: 0
BACK PAIN: 0

## 2019-11-12 NOTE — PROGRESS NOTES
Subjective:      Elizabeth Madel Schwab is a 59 y.o. female who presents with Sinus Problem (sinus congestion, headache,  dark yellow sputum ongoing several weeks)            HPI  59-year-old female presents urgent care with new problem of sinus pain, nasal congestion and headache onset about 2 weeks ago.  Reports associated subjective fevers, chills, intermittent ear pain and sputum production.  Patient denies cough, sore throat, chest pain or shortness of breath.  She has tried OTC Claritin with no symptomatic relief.  Denies other associated aggravating or alleviating factors.     Review of Systems   Constitutional: Positive for chills, fever and malaise/fatigue.   HENT: Positive for congestion, ear pain and sinus pain. Negative for sore throat.    Eyes: Negative for pain, discharge and redness.   Respiratory: Positive for sputum production. Negative for cough and shortness of breath.    Cardiovascular: Negative for chest pain.   Gastrointestinal: Negative for abdominal pain, nausea and vomiting.   Musculoskeletal: Positive for myalgias. Negative for back pain and neck pain.   Skin: Negative for rash.   Neurological: Positive for headaches. Negative for dizziness.   Endo/Heme/Allergies: Positive for environmental allergies.       Past Medical History:   Diagnosis Date   • CAP (community acquired pneumonia)    • EBV infection 5/17/2016   • Hypertension    • Kidney disease    • Postmenopausal HRT (hormone replacement therapy)    • Shingles     L SHOULDER   • Vitamin D deficiency 5/17/2016     Current Outpatient Medications on File Prior to Visit   Medication Sig Dispense Refill   • benazepril-hydrochlorthiazide (LOTENSIN HCT) 20-12.5 MG per tablet TAKE 1 TAB BY MOUTH EVERY DAY. 30 Tab 0   • estradiol (ESTRACE) 1 MG Tab TAKE 1 TAB BY MOUTH EVERY DAY. 30 Tab 0   • promethazine-dextromethorphan (PROMETHAZINE-DM) 6.25-15 MG/5ML syrup Take 5 mL by mouth 4 times a day as needed for Cough. (Patient not taking: Reported on  "3/4/2019) 140 mL 0   • albuterol 108 (90 Base) MCG/ACT Aero Soln inhalation aerosol Inhale 2 Puffs by mouth every four hours as needed for Shortness of Breath. (Patient not taking: Reported on 3/4/2019) 1 Inhaler 0   • diphenhydrAMINE (BENADRYL ALLERGY) 25 MG Tab Take 25 mg by mouth every 6 hours as needed for Sleep.     • Loratadine (CLARITIN PO) Take  by mouth.       No current facility-administered medications on file prior to visit.      Allergies   Allergen Reactions   • Clindamycin Hcl Itching and Swelling     Hands and feet    • Nitrofurantoin    • Penicillins    • Sulfa Drugs      Social History     Tobacco Use   • Smoking status: Current Every Day Smoker     Packs/day: 0.50     Types: Cigarettes   • Smokeless tobacco: Never Used   • Tobacco comment: 1-2 day, intermittent use   Substance Use Topics   • Alcohol use: No      Objective:     /76   Pulse 73   Temp 36.4 °C (97.6 °F) (Temporal)   Resp 16   Ht 1.575 m (5' 2\")   Wt 72.1 kg (159 lb)   SpO2 95%   BMI 29.08 kg/m²      Physical Exam  Vitals signs reviewed.   Constitutional:       General: She is not in acute distress.     Appearance: Normal appearance. She is well-developed.   HENT:      Head: Normocephalic and atraumatic.      Right Ear: Tympanic membrane normal.      Ears:      Comments: Left cerumen impaction     Nose: Mucosal edema and rhinorrhea present.      Right Sinus: Maxillary sinus tenderness and frontal sinus tenderness present.      Left Sinus: Maxillary sinus tenderness and frontal sinus tenderness present.      Mouth/Throat:      Mouth: Mucous membranes are dry.      Pharynx: Oropharynx is clear. Uvula midline. No posterior oropharyngeal erythema.      Tonsils: No tonsillar exudate.   Neck:      Musculoskeletal: Normal range of motion and neck supple.   Cardiovascular:      Rate and Rhythm: Normal rate and regular rhythm.      Heart sounds: Normal heart sounds.   Pulmonary:      Effort: Pulmonary effort is normal. No " respiratory distress.      Breath sounds: Normal breath sounds.   Abdominal:      General: Bowel sounds are normal. There is no distension.      Palpations: Abdomen is soft.   Musculoskeletal: Normal range of motion.   Skin:     General: Skin is warm and dry.      Findings: No rash.   Neurological:      General: No focal deficit present.      Mental Status: She is alert and oriented to person, place, and time.   Psychiatric:         Mood and Affect: Mood normal.         Behavior: Behavior normal.         Thought Content: Thought content normal.         Judgment: Judgment normal.                 Assessment/Plan:     1. Acute non-recurrent pansinusitis  azithromycin (ZITHROMAX) 250 MG Tab     Follow up with PCP on Friday as scheduled.   PT should follow up with PCP in 1-2 days for re-evaluation if symptoms have not improved.  Discussed red flags and reasons to return to UC or ED.  Pt and/or family verbalized understanding of diagnosis and follow up instructions and was offered informational handout on diagnosis.  PT discharged.

## 2020-09-25 ENCOUNTER — OFFICE VISIT (OUTPATIENT)
Dept: URGENT CARE | Facility: PHYSICIAN GROUP | Age: 60
End: 2020-09-25
Payer: COMMERCIAL

## 2020-09-25 ENCOUNTER — HOSPITAL ENCOUNTER (OUTPATIENT)
Facility: MEDICAL CENTER | Age: 60
End: 2020-09-25
Attending: NURSE PRACTITIONER
Payer: COMMERCIAL

## 2020-09-25 VITALS
HEIGHT: 62 IN | WEIGHT: 158 LBS | TEMPERATURE: 98.5 F | DIASTOLIC BLOOD PRESSURE: 80 MMHG | RESPIRATION RATE: 17 BRPM | BODY MASS INDEX: 29.08 KG/M2 | SYSTOLIC BLOOD PRESSURE: 132 MMHG | HEART RATE: 82 BPM | OXYGEN SATURATION: 100 %

## 2020-09-25 DIAGNOSIS — J02.9 PHARYNGITIS, UNSPECIFIED ETIOLOGY: ICD-10-CM

## 2020-09-25 DIAGNOSIS — B96.89 ACUTE BACTERIAL RHINOSINUSITIS: ICD-10-CM

## 2020-09-25 DIAGNOSIS — J01.90 ACUTE BACTERIAL RHINOSINUSITIS: ICD-10-CM

## 2020-09-25 DIAGNOSIS — Z20.822 EXPOSURE TO COVID-19 VIRUS: ICD-10-CM

## 2020-09-25 DIAGNOSIS — Z20.822 EXPOSURE TO COVID-19 VIRUS: Primary | ICD-10-CM

## 2020-09-25 PROCEDURE — 99214 OFFICE O/P EST MOD 30 MIN: CPT | Mod: CS | Performed by: NURSE PRACTITIONER

## 2020-09-25 PROCEDURE — U0003 INFECTIOUS AGENT DETECTION BY NUCLEIC ACID (DNA OR RNA); SEVERE ACUTE RESPIRATORY SYNDROME CORONAVIRUS 2 (SARS-COV-2) (CORONAVIRUS DISEASE [COVID-19]), AMPLIFIED PROBE TECHNIQUE, MAKING USE OF HIGH THROUGHPUT TECHNOLOGIES AS DESCRIBED BY CMS-2020-01-R: HCPCS

## 2020-09-25 RX ORDER — DOXYCYCLINE HYCLATE 100 MG
100 TABLET ORAL 2 TIMES DAILY
Qty: 14 TAB | Refills: 0 | Status: SHIPPED | OUTPATIENT
Start: 2020-09-25 | End: 2020-10-02

## 2020-09-25 ASSESSMENT — ENCOUNTER SYMPTOMS
FOCAL WEAKNESS: 0
WHEEZING: 0
NECK PAIN: 0
DIZZINESS: 1
SORE THROAT: 1
WEAKNESS: 0
HOARSE VOICE: 0
CONSTIPATION: 0
MYALGIAS: 0
BACK PAIN: 0
DIARRHEA: 0
SEIZURES: 0
HEARTBURN: 0
HEADACHES: 1
SPEECH CHANGE: 0
SINUS PAIN: 1
ORTHOPNEA: 0
NAUSEA: 0
SWOLLEN GLANDS: 0
TINGLING: 0
SHORTNESS OF BREATH: 0
CHILLS: 1
SINUS PRESSURE: 1
MEMORY LOSS: 0
COUGH: 0
FEVER: 1
VOMITING: 0
DIAPHORESIS: 0
PALPITATIONS: 0

## 2020-09-25 NOTE — PROGRESS NOTES
"Subjective:      Elizabeth Madel Schwab is a 60 y.o. female who presents with Headache (Dizziness, Fever, Headaches x 4 days, positive exposure)            Known positive covid exposures at work x 2.      Sinusitis  This is a new problem. The current episode started in the past 7 days. The problem has been gradually worsening since onset. The maximum temperature recorded prior to her arrival was 100.4 - 100.9 F. The fever has been present for less than 1 day. Her pain is at a severity of 5/10. The pain is moderate. Associated symptoms include chills, congestion, headaches, sinus pressure and a sore throat. Pertinent negatives include no coughing, diaphoresis, ear pain, hoarse voice, neck pain, shortness of breath, sneezing or swollen glands. Past treatments include saline sprays. The treatment provided mild relief.       Review of Systems   Constitutional: Positive for chills and fever. Negative for diaphoresis and malaise/fatigue.   HENT: Positive for congestion, sinus pressure, sinus pain and sore throat. Negative for ear pain, hoarse voice and sneezing.    Respiratory: Negative for cough, shortness of breath and wheezing.    Cardiovascular: Negative for chest pain, palpitations, orthopnea and leg swelling.   Gastrointestinal: Negative for constipation, diarrhea, heartburn, nausea and vomiting.   Musculoskeletal: Negative for back pain, joint pain, myalgias and neck pain.   Skin: Negative for rash.   Neurological: Positive for dizziness and headaches. Negative for tingling, speech change, focal weakness, seizures and weakness.   Psychiatric/Behavioral: Negative for memory loss and suicidal ideas.   All other systems reviewed and are negative.         Objective:     /80 (BP Location: Left arm, Patient Position: Sitting, BP Cuff Size: Adult)   Pulse 82   Temp 36.9 °C (98.5 °F) (Temporal)   Resp 17   Ht 1.575 m (5' 2\")   Wt 71.7 kg (158 lb)   SpO2 100%   BMI 28.90 kg/m²      Physical Exam  Vitals signs " reviewed.   Constitutional:       General: She is not in acute distress.     Appearance: Normal appearance. She is not ill-appearing.   HENT:      Head: Normocephalic.      Right Ear: Tympanic membrane, ear canal and external ear normal.      Left Ear: Tympanic membrane, ear canal and external ear normal.      Nose: Congestion present.      Right Sinus: Maxillary sinus tenderness and frontal sinus tenderness present.      Left Sinus: Maxillary sinus tenderness and frontal sinus tenderness present.      Mouth/Throat:      Lips: Pink.      Mouth: Mucous membranes are moist.      Pharynx: Uvula midline. Posterior oropharyngeal erythema present. No pharyngeal swelling, oropharyngeal exudate or uvula swelling.   Eyes:      Extraocular Movements: Extraocular movements intact.      Conjunctiva/sclera: Conjunctivae normal.      Pupils: Pupils are equal, round, and reactive to light.   Neck:      Musculoskeletal: Normal range of motion and neck supple. No neck rigidity or muscular tenderness.      Vascular: No carotid bruit.   Cardiovascular:      Rate and Rhythm: Normal rate and regular rhythm.      Pulses: Normal pulses.      Heart sounds: Normal heart sounds. No murmur.   Pulmonary:      Effort: Pulmonary effort is normal. No respiratory distress.      Breath sounds: Normal breath sounds. No wheezing, rhonchi or rales.   Abdominal:      General: Abdomen is flat.      Palpations: Abdomen is soft.   Musculoskeletal: Normal range of motion.   Lymphadenopathy:      Cervical: No cervical adenopathy.   Skin:     General: Skin is warm and dry.      Capillary Refill: Capillary refill takes less than 2 seconds.   Neurological:      Mental Status: She is alert and oriented to person, place, and time.      GCS: GCS eye subscore is 4. GCS motor subscore is 6.      Cranial Nerves: Cranial nerves are intact.      Sensory: No sensory deficit.      Motor: Motor function is intact. No weakness, tremor or pronator drift.      Coordination:  Romberg sign negative. Coordination normal.      Gait: Gait is intact.   Psychiatric:         Mood and Affect: Mood normal.         Behavior: Behavior normal.                 Assessment/Plan:        1. Exposure to COVID-19 virus  COVID/SARS COV-2 PCR   2. Acute bacterial rhinosinusitis  doxycycline (VIBRAMYCIN) 100 MG Tab   3. Pharyngitis, unspecified etiology  COVID/SARS COV-2 PCR       Discussed with patient that her symptoms are consistent with Sinusitis however due to known Covid exposure we will swab her today. Vitals are stable at this time.  Patient is advised to self isolate and provided with self isolation precautions AVS information.  Discussed when to return to urgent care or ER including for worsening shortness of breath.  Patient verbalized understanding and has no additional questions.    FU with PCP or return to Urgent Care in 5-7 days if no improvement in symptoms, sooner if increased or worsening symptoms.   Humidifier at noc may be beneficial.   OTC antihistamine of choice - non-drowsy. Take as directed by   OTC fluticasone of choice- Take as directed by   Keep well hydrated

## 2020-09-26 LAB
COVID ORDER STATUS COVID19: NORMAL
SARS-COV-2 RNA RESP QL NAA+PROBE: NOTDETECTED
SPECIMEN SOURCE: NORMAL

## 2020-09-27 ENCOUNTER — TELEPHONE (OUTPATIENT)
Dept: URGENT CARE | Facility: PHYSICIAN GROUP | Age: 60
End: 2020-09-27

## 2021-03-15 DIAGNOSIS — Z23 NEED FOR VACCINATION: ICD-10-CM

## 2021-05-18 ENCOUNTER — OFFICE VISIT (OUTPATIENT)
Dept: URGENT CARE | Facility: PHYSICIAN GROUP | Age: 61
End: 2021-05-18
Payer: COMMERCIAL

## 2021-05-18 VITALS
TEMPERATURE: 97.8 F | SYSTOLIC BLOOD PRESSURE: 130 MMHG | WEIGHT: 158 LBS | OXYGEN SATURATION: 95 % | DIASTOLIC BLOOD PRESSURE: 86 MMHG | RESPIRATION RATE: 16 BRPM | HEART RATE: 66 BPM | HEIGHT: 62 IN | BODY MASS INDEX: 29.08 KG/M2

## 2021-05-18 DIAGNOSIS — K05.6 PERIODONTAL DISEASE: ICD-10-CM

## 2021-05-18 DIAGNOSIS — Z72.0 TOBACCO ABUSE: ICD-10-CM

## 2021-05-18 DIAGNOSIS — K04.7 DENTAL ABSCESS: ICD-10-CM

## 2021-05-18 PROCEDURE — 99999 PR NO CHARGE: CPT | Performed by: STUDENT IN AN ORGANIZED HEALTH CARE EDUCATION/TRAINING PROGRAM

## 2021-05-18 PROCEDURE — 99214 OFFICE O/P EST MOD 30 MIN: CPT | Performed by: STUDENT IN AN ORGANIZED HEALTH CARE EDUCATION/TRAINING PROGRAM

## 2021-05-18 RX ORDER — CLINDAMYCIN HYDROCHLORIDE 300 MG/1
300 CAPSULE ORAL 3 TIMES DAILY
Qty: 21 CAPSULE | Refills: 0 | Status: SHIPPED | OUTPATIENT
Start: 2021-05-18 | End: 2021-05-25

## 2021-05-18 NOTE — PROGRESS NOTES
Subjective:   CHIEF COMPLAINT  Chief Complaint   Patient presents with   • Oral Swelling     poss abscess in mouth       HPI  Elizabeth Madel Schwab is a 60 y.o. female who presents with a chief complaint of a right upper tooth abscess associated headache.  Says the headache started 4 days ago and then she noted some draining along the right upper gum yesterday.  Said the headache dramatically improved after the drainage.  She reports she has had dental abscesses in the past.  Says she has not seen a dentist in years.  She does use tobacco, approximately 1/2 pack/day.    REVIEW OF SYSTEMS  General: no fever or chills  GI: no nausea or vomiting  See HPI for further details.    PAST MEDICAL HISTORY  Patient Active Problem List    Diagnosis Date Noted   • EBV infection 05/17/2016   • Sleep apnea 05/17/2016   • Overweight 05/17/2016   • Dyslipidemia 05/17/2016   • Vitamin D deficiency 05/17/2016   • Tobacco abuse 05/17/2016   • Postmenopausal status 05/17/2016   • Essential hypertension 10/09/2015   • Postmenopausal HRT (hormone replacement therapy) 09/09/2014       SURGICAL HISTORY   has a past surgical history that includes abdominal hysterectomy total.    ALLERGIES  Allergies   Allergen Reactions   • Clindamycin Hcl Itching and Swelling     Hands and feet    • Nitrofurantoin    • Penicillins    • Sulfa Drugs        CURRENT MEDICATIONS  Home Medications     Reviewed by Ray Jessica Ass't (Medical Assistant) on 05/18/21 at 1024  Med List Status: <None>   Medication Last Dose Status   benazepril-hydrochlorthiazide (LOTENSIN HCT) 20-12.5 MG per tablet Taking Active   diphenhydrAMINE (BENADRYL ALLERGY) 25 MG Tab Not Taking Flagged for Removal   estradiol (ESTRACE) 1 MG Tab Taking Active   Loratadine (CLARITIN PO) Not Taking Flagged for Removal                SOCIAL HISTORY  Social History     Tobacco Use   • Smoking status: Current Every Day Smoker     Packs/day: 0.50     Types: Cigarettes   • Smokeless tobacco:  "Never Used   • Tobacco comment: 1-2 day, intermittent use   Substance and Sexual Activity   • Alcohol use: No   • Drug use: No   • Sexual activity: Yes     Partners: Male       FAMILY HISTORY  Family History   Problem Relation Age of Onset   • Dementia Mother    • Arthritis Mother    • Heart Attack Father    • Heart Disease Father           Objective:   PHYSICAL EXAM  VITAL SIGNS: /86 (BP Location: Left arm, Patient Position: Sitting, BP Cuff Size: Small adult)   Pulse 66   Temp 36.6 °C (97.8 °F) (Temporal)   Resp 16   Ht 1.575 m (5' 2\")   Wt 71.7 kg (158 lb)   SpO2 95%   BMI 28.90 kg/m²     Gen: no acute distress, normal voice  Skin: dry, intact, moist mucosal membranes  ENT: Diffuse periodontal disease.  No palpable/drainable abscess.  Lungs: CTAB w/ symmetric expansion  CV: RRR w/o murmurs or clicks  Psych: normal affect, normal judgement, alert, awake    Assessment/Plan:     1. Dental abscess     2. Periodontal disease     3. Tobacco abuse     -Ordered Rx for clindamycin  -Instructed to follow-up with dentist  -Encouraged tobacco cessation    Differential diagnosis, natural history, supportive care, and indications for immediate follow-up discussed. All questions answered. Patient agrees with the plan of care.    Follow-up as needed if symptoms worsen or fail to improve to PCP, Urgent care or Emergency Room.    >30 minutes was spent caring for this patient on the day of the encounter which included face-to-face time, discussing the diagnosis, medical management, follow-up, emergency room precautions and completion of the chart.    Please note that this dictation was created using voice recognition software. I have made a reasonable attempt to correct obvious errors, but I expect that there are errors of grammar and possibly content that I did not discover before finalizing the note.         "

## 2021-09-21 ENCOUNTER — OFFICE VISIT (OUTPATIENT)
Dept: URGENT CARE | Facility: PHYSICIAN GROUP | Age: 61
End: 2021-09-21
Payer: COMMERCIAL

## 2021-09-21 ENCOUNTER — HOSPITAL ENCOUNTER (OUTPATIENT)
Facility: MEDICAL CENTER | Age: 61
End: 2021-09-21
Attending: PHYSICIAN ASSISTANT
Payer: COMMERCIAL

## 2021-09-21 VITALS
WEIGHT: 158 LBS | DIASTOLIC BLOOD PRESSURE: 62 MMHG | SYSTOLIC BLOOD PRESSURE: 120 MMHG | RESPIRATION RATE: 16 BRPM | TEMPERATURE: 97.8 F | HEART RATE: 71 BPM | BODY MASS INDEX: 29.08 KG/M2 | OXYGEN SATURATION: 99 % | HEIGHT: 62 IN

## 2021-09-21 DIAGNOSIS — R51.9 NONINTRACTABLE HEADACHE, UNSPECIFIED CHRONICITY PATTERN, UNSPECIFIED HEADACHE TYPE: ICD-10-CM

## 2021-09-21 DIAGNOSIS — J02.9 PHARYNGITIS, UNSPECIFIED ETIOLOGY: ICD-10-CM

## 2021-09-21 DIAGNOSIS — Z20.822 SUSPECTED COVID-19 VIRUS INFECTION: ICD-10-CM

## 2021-09-21 LAB
COVID ORDER STATUS COVID19: NORMAL
FLUAV+FLUBV AG SPEC QL IA: NEGATIVE
INT CON NEG: NORMAL
INT CON POS: NORMAL

## 2021-09-21 PROCEDURE — U0003 INFECTIOUS AGENT DETECTION BY NUCLEIC ACID (DNA OR RNA); SEVERE ACUTE RESPIRATORY SYNDROME CORONAVIRUS 2 (SARS-COV-2) (CORONAVIRUS DISEASE [COVID-19]), AMPLIFIED PROBE TECHNIQUE, MAKING USE OF HIGH THROUGHPUT TECHNOLOGIES AS DESCRIBED BY CMS-2020-01-R: HCPCS

## 2021-09-21 PROCEDURE — U0005 INFEC AGEN DETEC AMPLI PROBE: HCPCS

## 2021-09-21 PROCEDURE — 99213 OFFICE O/P EST LOW 20 MIN: CPT | Mod: CS | Performed by: PHYSICIAN ASSISTANT

## 2021-09-21 PROCEDURE — 87804 INFLUENZA ASSAY W/OPTIC: CPT | Performed by: PHYSICIAN ASSISTANT

## 2021-09-21 RX ORDER — BENAZEPRIL HYDROCHLORIDE AND HYDROCHLOROTHIAZIDE 20; 12.5 MG/1; MG/1
TABLET ORAL
COMMUNITY
Start: 2016-02-18 | End: 2021-09-21

## 2021-09-21 RX ORDER — ESTRADIOL 1 MG/1
TABLET ORAL
COMMUNITY
End: 2021-09-21

## 2021-09-21 ASSESSMENT — ENCOUNTER SYMPTOMS
WHEEZING: 0
SORE THROAT: 1
SHORTNESS OF BREATH: 0
NAUSEA: 0
DIARRHEA: 0
VOMITING: 0
ABDOMINAL PAIN: 0
HEADACHES: 1
CHILLS: 0
FEVER: 0

## 2021-09-21 NOTE — PROGRESS NOTES
"Subjective:   Elizabeth Madel Schwab  is a 61 y.o. female who presents for Runny Nose (sore throat, headache, chills, no appetite, x1 day exposed to positive covid , requesting covid )      HPI    Patient presents urgent care complaining of nasal congestion, sore throat, headache.  Notes some chills and body aches without fever.  Complains of loss of appetite but denies nausea vomiting abdominal pain diarrhea or rash.  Patient notes multiple potential exposures to individuals that may be have COVID-19.  She is status post Covid vaccination.  She denies loss of taste or loss of smell.  She notes minimal coughing.  Remote history of pneumonia.  Has been treating symptoms with ibuprofen.  Request Covid testing as well as flu testing.    Review of Systems   Constitutional: Negative for chills and fever.   HENT: Positive for congestion and sore throat.    Respiratory: Negative for shortness of breath and wheezing.    Gastrointestinal: Negative for abdominal pain, diarrhea, nausea and vomiting.   Skin: Negative for rash.   Neurological: Positive for headaches.       Allergies   Allergen Reactions   • Nitrofurantoin    • Penicillins    • Sulfa Drugs         Objective:   /62 (BP Location: Right arm, Patient Position: Sitting, BP Cuff Size: Adult)   Pulse 71   Temp 36.6 °C (97.8 °F) (Temporal)   Resp 16   Ht 1.575 m (5' 2\")   Wt 71.7 kg (158 lb)   SpO2 99%   BMI 28.90 kg/m²     Physical Exam  Vitals and nursing note reviewed.   Constitutional:       General: She is not in acute distress.     Appearance: She is well-developed. She is not diaphoretic.   HENT:      Head: Normocephalic and atraumatic.      Right Ear: Tympanic membrane, ear canal and external ear normal.      Left Ear: Tympanic membrane, ear canal and external ear normal.      Nose: Nose normal.      Mouth/Throat:      Pharynx: Uvula midline. Posterior oropharyngeal erythema ( mild PND) present. No oropharyngeal exudate.      Tonsils: No tonsillar " abscesses.   Eyes:      General: Lids are normal. No scleral icterus.        Right eye: No discharge.         Left eye: No discharge.      Conjunctiva/sclera: Conjunctivae normal.   Pulmonary:      Effort: Pulmonary effort is normal. No respiratory distress.      Breath sounds: No decreased breath sounds, wheezing, rhonchi or rales.   Musculoskeletal:         General: Normal range of motion.      Cervical back: Neck supple.   Lymphadenopathy:      Cervical: Cervical adenopathy ( mild bilat) present.   Skin:     General: Skin is warm and dry.      Coloration: Skin is not pale.      Findings: No erythema.   Neurological:      Mental Status: She is alert and oriented to person, place, and time. She is not disoriented.   Psychiatric:         Speech: Speech normal.         Behavior: Behavior normal.     COVID pending  POCT flu - NEG    Assessment/Plan:   1. Nonintractable headache, unspecified chronicity pattern, unspecified headache type  - COVID/SARS CoV-2 PCR; Future  - POCT Influenza A/B    2. Pharyngitis, unspecified etiology  - COVID/SARS CoV-2 PCR; Future  - POCT Influenza A/B    3. Suspected COVID-19 virus infection  - COVID/SARS CoV-2 PCR; Future  Supportive care is reviewed with patient/caregiver - recommend to push PO fluids and electrolytes, over-the-counter symptom support medications reviewed, ER precautions with worsened symptoms, quarantine recommendations reviewed, sent with [facility] for results of testing  Return to clinic with lack of resolution or progression of symptoms.  ER precautions with any worsening symptoms are reviewed with patient/caregiver and they do express understanding      I have worn an N95 mask, gloves and eye protection for the entire encounter with this patient.     Differential diagnosis, natural history, supportive care, and indications for immediate follow-up discussed.

## 2021-09-22 LAB
SARS-COV-2 RNA RESP QL NAA+PROBE: NOTDETECTED
SPECIMEN SOURCE: NORMAL

## 2021-10-01 ENCOUNTER — OFFICE VISIT (OUTPATIENT)
Dept: URGENT CARE | Facility: PHYSICIAN GROUP | Age: 61
End: 2021-10-01
Payer: COMMERCIAL

## 2021-10-01 ENCOUNTER — HOSPITAL ENCOUNTER (OUTPATIENT)
Facility: MEDICAL CENTER | Age: 61
End: 2021-10-01
Attending: PHYSICIAN ASSISTANT
Payer: COMMERCIAL

## 2021-10-01 ENCOUNTER — APPOINTMENT (OUTPATIENT)
Dept: RADIOLOGY | Facility: IMAGING CENTER | Age: 61
End: 2021-10-01
Attending: PHYSICIAN ASSISTANT
Payer: COMMERCIAL

## 2021-10-01 VITALS
TEMPERATURE: 97.6 F | WEIGHT: 158 LBS | HEIGHT: 62 IN | BODY MASS INDEX: 29.08 KG/M2 | SYSTOLIC BLOOD PRESSURE: 132 MMHG | DIASTOLIC BLOOD PRESSURE: 82 MMHG | OXYGEN SATURATION: 99 % | RESPIRATION RATE: 18 BRPM | HEART RATE: 70 BPM

## 2021-10-01 DIAGNOSIS — J98.01 BRONCHOSPASM: ICD-10-CM

## 2021-10-01 DIAGNOSIS — R68.83 CHILLS (WITHOUT FEVER): ICD-10-CM

## 2021-10-01 DIAGNOSIS — Z20.822 SUSPECTED COVID-19 VIRUS INFECTION: ICD-10-CM

## 2021-10-01 PROCEDURE — 71046 X-RAY EXAM CHEST 2 VIEWS: CPT | Mod: TC | Performed by: PHYSICIAN ASSISTANT

## 2021-10-01 PROCEDURE — U0003 INFECTIOUS AGENT DETECTION BY NUCLEIC ACID (DNA OR RNA); SEVERE ACUTE RESPIRATORY SYNDROME CORONAVIRUS 2 (SARS-COV-2) (CORONAVIRUS DISEASE [COVID-19]), AMPLIFIED PROBE TECHNIQUE, MAKING USE OF HIGH THROUGHPUT TECHNOLOGIES AS DESCRIBED BY CMS-2020-01-R: HCPCS

## 2021-10-01 PROCEDURE — U0005 INFEC AGEN DETEC AMPLI PROBE: HCPCS

## 2021-10-01 PROCEDURE — 99214 OFFICE O/P EST MOD 30 MIN: CPT | Mod: CS | Performed by: PHYSICIAN ASSISTANT

## 2021-10-01 RX ORDER — ALBUTEROL SULFATE 90 UG/1
2 AEROSOL, METERED RESPIRATORY (INHALATION) EVERY 6 HOURS PRN
Qty: 8.5 G | Refills: 2 | Status: SHIPPED | OUTPATIENT
Start: 2021-10-01 | End: 2023-12-05

## 2021-10-01 RX ORDER — AZITHROMYCIN 250 MG/1
TABLET, FILM COATED ORAL
Qty: 6 TABLET | Refills: 0 | Status: SHIPPED | OUTPATIENT
Start: 2021-10-01 | End: 2022-08-11

## 2021-10-01 ASSESSMENT — ENCOUNTER SYMPTOMS
SORE THROAT: 1
VOMITING: 0
MYALGIAS: 1
NAUSEA: 1
SHORTNESS OF BREATH: 0
CHILLS: 1
COUGH: 0
DIARRHEA: 0
SPUTUM PRODUCTION: 0
WHEEZING: 1
ABDOMINAL PAIN: 0
FEVER: 0

## 2021-10-01 NOTE — PROGRESS NOTES
Subjective:   Elizabeth Madel Schwab  is a 61 y.o. female who presents for Chills (chills and wheezing, pain on the upper back. she was seen 2 weeks ago an was tested for covid and flu, negative. and she retested for covid and it was negative as well. )      Chills  Associated symptoms include chills, myalgias, nausea and a sore throat. Pertinent negatives include no abdominal pain, congestion, coughing, fever, rash or vomiting.   Patient presents urgent care complaining of loss 11 days of symptoms. She had been seen originally on 9/21. At that time she had primarily sore throat and a recent exposure to someone positive for COVID-19. Over the interim her  progressed with coughing upper respiratory infection and significant fatigue. Her  has improved and patient now has had persistent fatigue, chills body aches and subjective fever, wheezing without much coughing and some back pain on left side. She does have a past medical history of pneumonia and back pain is reminiscent of this and she comes to clinic today requesting chest x-ray. She notes having tested negative for COVID-19 11 days ago and again through the [facility] over the interim. She has had Covid vaccine. She denies loss of taste or smell for her or her . She notes some improvement of sore throat. In general symptoms seem like they are slightly improving but with recent back pain she wanted to rule out pneumonia as contributory. She complains of mild intermittent nausea but denies vomiting. Denies abdominal pain diarrhea or rash. Denies history of asthma or needing MDI. She states she was hospitalized with pneumonia and did have nebulizer treatments. She has used over-the-counter fever reducers and NyQuil.    Review of Systems   Constitutional: Positive for chills. Negative for fever.   HENT: Positive for sore throat. Negative for congestion and ear pain.    Respiratory: Positive for wheezing. Negative for cough, sputum production  "and shortness of breath.    Gastrointestinal: Positive for nausea. Negative for abdominal pain, diarrhea and vomiting.   Musculoskeletal: Positive for myalgias.   Skin: Negative for rash.       Allergies   Allergen Reactions   • Nitrofurantoin    • Penicillins    • Sulfa Drugs         Objective:   /82   Pulse 70   Temp 36.4 °C (97.6 °F) (Temporal)   Resp 18   Ht 1.575 m (5' 2\")   Wt 71.7 kg (158 lb)   SpO2 99%   BMI 28.90 kg/m²     Physical Exam  Vitals and nursing note reviewed.   Constitutional:       General: She is not in acute distress.     Appearance: She is well-developed. She is not diaphoretic.   HENT:      Head: Normocephalic and atraumatic.      Right Ear: Tympanic membrane, ear canal and external ear normal.      Left Ear: Tympanic membrane, ear canal and external ear normal.      Nose: Nose normal.      Mouth/Throat:      Pharynx: Uvula midline. Posterior oropharyngeal erythema ( mild PND) present. No oropharyngeal exudate.      Tonsils: No tonsillar abscesses.   Eyes:      General: Lids are normal. No scleral icterus.        Right eye: No discharge.         Left eye: No discharge.      Conjunctiva/sclera: Conjunctivae normal.   Pulmonary:      Effort: Pulmonary effort is normal. No respiratory distress.      Breath sounds: Normal breath sounds. No decreased breath sounds, wheezing, rhonchi or rales.   Musculoskeletal:         General: Normal range of motion.      Cervical back: Neck supple.   Lymphadenopathy:      Cervical: Cervical adenopathy ( mild bilat) present.   Skin:     General: Skin is warm and dry.      Coloration: Skin is not pale.      Findings: No erythema.   Neurological:      Mental Status: She is alert and oriented to person, place, and time. She is not disoriented.   Psychiatric:         Speech: Speech normal.         Behavior: Behavior normal.     CXR -   10/1/2021 2:35 PM     HISTORY/REASON FOR EXAM:  Back pain. Previous history of pneumonia.        TECHNIQUE/EXAM " DESCRIPTION AND NUMBER OF VIEWS:  Two views of the chest.     COMPARISON:  12/20/2017     FINDINGS:  The heart is normal in size.  No pulmonary infiltrates or consolidations are noted.  No pleural effusions are appreciated.        IMPRESSION:     No acute cardiopulmonary disease.        Exam Ended: 10/01/21  2:46 PM Last Resulted: 10/01/21  2:50 PM           COVID pending    Assessment/Plan:   1. Chills (without fever)  - COVID/SARS CoV-2 PCR; Future  - DX-CHEST-2 VIEWS; Future  - azithromycin (ZITHROMAX) 250 MG Tab; Take as directed on package. Dispense one package.  Dispense: 6 Tablet; Refill: 0    2. Bronchospasm  - albuterol 108 (90 Base) MCG/ACT Aero Soln inhalation aerosol; Inhale 2 Puffs every 6 hours as needed for Shortness of Breath.  Dispense: 8.5 g; Refill: 2  - azithromycin (ZITHROMAX) 250 MG Tab; Take as directed on package. Dispense one package.  Dispense: 6 Tablet; Refill: 0    3. Suspected COVID-19 virus infection  Supportive care is reviewed with patient/caregiver - recommend to push PO fluids and electrolytes, over-the-counter symptom support medications reviewed, ER precautions with worsened symptoms, quarantine recommendations reviewed, sent with letter, [facility] [employer] for results of testing  Contingent antibiotic prescription given to patient to fill upon meeting criteria of guidelines discussed.   Patient requests antibiotic coverage, reviewed with her that there is little indication for antibiotics at this point.  Based on duration of symptoms I am amenable to trying.     take full course of Rx, take with probiotics, observe for resolution  Return to clinic with lack of resolution or progression of symptoms.  ER precautions with any worsening symptoms are reviewed with patient/caregiver and they do express understanding    I have worn an N95 mask, gloves and eye protection for the entire encounter with this patient.     Differential diagnosis, natural history, supportive care, and indications for  immediate follow-up discussed.    My total time spent caring for the patient on the day of the encounter was 30 minutes.   This does not include time spent on separately billable procedures/tests.

## 2021-10-02 DIAGNOSIS — R68.83 CHILLS (WITHOUT FEVER): ICD-10-CM

## 2022-08-09 ENCOUNTER — OFFICE VISIT (OUTPATIENT)
Dept: URGENT CARE | Facility: PHYSICIAN GROUP | Age: 62
End: 2022-08-09
Payer: COMMERCIAL

## 2022-08-09 ENCOUNTER — HOSPITAL ENCOUNTER (OUTPATIENT)
Facility: MEDICAL CENTER | Age: 62
End: 2022-08-09
Attending: NURSE PRACTITIONER
Payer: COMMERCIAL

## 2022-08-09 VITALS
DIASTOLIC BLOOD PRESSURE: 82 MMHG | WEIGHT: 153 LBS | TEMPERATURE: 99.2 F | SYSTOLIC BLOOD PRESSURE: 118 MMHG | BODY MASS INDEX: 28.16 KG/M2 | HEART RATE: 73 BPM | HEIGHT: 62 IN | OXYGEN SATURATION: 98 % | RESPIRATION RATE: 16 BRPM

## 2022-08-09 DIAGNOSIS — J02.9 PHARYNGITIS, UNSPECIFIED ETIOLOGY: ICD-10-CM

## 2022-08-09 DIAGNOSIS — Z20.822 SUSPECTED COVID-19 VIRUS INFECTION: Primary | ICD-10-CM

## 2022-08-09 DIAGNOSIS — H69.91 EUSTACHIAN TUBE DYSFUNCTION, RIGHT: ICD-10-CM

## 2022-08-09 DIAGNOSIS — Z20.822 SUSPECTED COVID-19 VIRUS INFECTION: ICD-10-CM

## 2022-08-09 LAB
COVID ORDER STATUS COVID19: NORMAL
EXTERNAL QUALITY CONTROL: NORMAL
SARS-COV+SARS-COV-2 AG RESP QL IA.RAPID: NEGATIVE
SARS-COV-2 RNA RESP QL NAA+PROBE: NOTDETECTED
SPECIMEN SOURCE: NORMAL

## 2022-08-09 PROCEDURE — 99213 OFFICE O/P EST LOW 20 MIN: CPT | Mod: CS | Performed by: NURSE PRACTITIONER

## 2022-08-09 PROCEDURE — 87426 SARSCOV CORONAVIRUS AG IA: CPT | Performed by: NURSE PRACTITIONER

## 2022-08-09 PROCEDURE — U0003 INFECTIOUS AGENT DETECTION BY NUCLEIC ACID (DNA OR RNA); SEVERE ACUTE RESPIRATORY SYNDROME CORONAVIRUS 2 (SARS-COV-2) (CORONAVIRUS DISEASE [COVID-19]), AMPLIFIED PROBE TECHNIQUE, MAKING USE OF HIGH THROUGHPUT TECHNOLOGIES AS DESCRIBED BY CMS-2020-01-R: HCPCS

## 2022-08-09 PROCEDURE — U0005 INFEC AGEN DETEC AMPLI PROBE: HCPCS

## 2022-08-09 NOTE — PROGRESS NOTES
"Subjective:     Elizabeth Madel Schwab is a 62 y.o. female who presents for Head Ache (Sore throat, right ear pain, x4 days)      HPI  Pt presents for evaluation of a new problem.  Patient presents to clinic with 4-day history of sore throat, nasal congestion and right ear pain. Using prescribed nasal spray and did attempt one dose of ibuprofen which did not alleviate her symptoms. Sick contacts include friend who is positive for covid 19. Denies cough, shortness of breath chest pain or lower leg swelling.  Denies nausea vomiting diarrhea.  No known fevers admits to body aches and feeling generally unwell.    ROS  As per HPI    PMH:   Past Medical History:   Diagnosis Date   • CAP (community acquired pneumonia)    • EBV infection 5/17/2016   • Hypertension    • Kidney disease    • Postmenopausal HRT (hormone replacement therapy)    • Shingles     L SHOULDER   • Vitamin D deficiency 5/17/2016     ALLERGIES:   Allergies   Allergen Reactions   • Nitrofurantoin    • Penicillins    • Sulfa Drugs      SURGHX:   Past Surgical History:   Procedure Laterality Date   • ABDOMINAL HYSTERECTOMY TOTAL       SOCHX:   Social History     Socioeconomic History   • Marital status:    Tobacco Use   • Smoking status: Current Every Day Smoker     Packs/day: 0.50     Types: Cigarettes   • Smokeless tobacco: Never Used   • Tobacco comment: 1-2 day, intermittent use   Substance and Sexual Activity   • Alcohol use: No   • Drug use: No   • Sexual activity: Yes     Partners: Male     FH:   Family History   Problem Relation Age of Onset   • Dementia Mother    • Arthritis Mother    • Heart Attack Father    • Heart Disease Father          Objective:   /82 (BP Location: Left arm, Patient Position: Sitting, BP Cuff Size: Adult)   Pulse 73   Temp 37.3 °C (99.2 °F) (Temporal)   Resp 16   Ht 1.575 m (5' 2\")   Wt 69.4 kg (153 lb)   SpO2 98%   BMI 27.98 kg/m²     Physical Exam  Vitals and nursing note reviewed.   Constitutional:      "  General: She is not in acute distress.     Appearance: Normal appearance. She is ill-appearing.   HENT:      Head: Normocephalic and atraumatic.      Right Ear: Hearing and external ear normal. A middle ear effusion is present. Tympanic membrane is not injected or bulging.      Left Ear: Hearing and external ear normal.  No middle ear effusion. Tympanic membrane is not injected or bulging.      Nose: Congestion and rhinorrhea present. Rhinorrhea is clear.      Right Sinus: No maxillary sinus tenderness or frontal sinus tenderness.      Left Sinus: No maxillary sinus tenderness or frontal sinus tenderness.      Mouth/Throat:      Mouth: Mucous membranes are moist.      Pharynx: Uvula midline. Posterior oropharyngeal erythema (post nasal drip with cobblestoning noted) present. No oropharyngeal exudate.      Tonsils: No tonsillar exudate or tonsillar abscesses.      Comments: Positive for post nasal drip  Eyes:      General: Lids are normal. Gaze aligned appropriately.      Extraocular Movements: Extraocular movements intact.      Conjunctiva/sclera: Conjunctivae normal.      Pupils: Pupils are equal, round, and reactive to light.   Cardiovascular:      Rate and Rhythm: Normal rate and regular rhythm.      Pulses: Normal pulses.      Heart sounds: Normal heart sounds.   Pulmonary:      Effort: Pulmonary effort is normal. No respiratory distress.      Breath sounds: Normal breath sounds. No wheezing.   Abdominal:      General: Abdomen is flat. There is no distension.   Musculoskeletal:         General: Normal range of motion.      Cervical back: Normal range of motion and neck supple. No muscular tenderness.      Right lower leg: No edema.      Left lower leg: No edema.   Lymphadenopathy:      Cervical: No cervical adenopathy.   Skin:     General: Skin is warm and dry.      Capillary Refill: Capillary refill takes less than 2 seconds.      Coloration: Skin is not cyanotic or pale.   Neurological:      General: No focal  deficit present.      Mental Status: She is alert and oriented to person, place, and time.   Psychiatric:         Mood and Affect: Mood normal.         Behavior: Behavior normal.         Judgment: Judgment normal.       Diagnostics :     Results for orders placed or performed during the hospital encounter of 10/01/21   COVID/SARS CoV-2 PCR    Specimen: Nasopharyngeal; Respirate   Result Value Ref Range    COVID Order Status Received    SARS-CoV-2, PCR (In-House)   Result Value Ref Range    SARS-CoV-2 Source Nasal Swab     SARS-CoV-2 by PCR NotDetected        Assessment/Plan:   Assessment    Using shared decision making did obtain a POCT COVID-19 swab of which the results were negative.  Discussed likely viral etiology as a cause of symptoms did discuss antibiotics are not needed at this time.  Did discuss appropriate over-the-counter medications to help alleviate symptoms as well as signs and symptoms of bacterial infection such as bacterial sinusitis or otitis media.  Did discuss how to retrieve MyChart results as well as notification of positive or negative COVID-19 results.       1. Suspected COVID-19 virus infection    - POCT SARS-COV Antigen KELLI (Symptomatic only)  - SARS-CoV-2 PCR (24 hour In-House): Collect NP swab in VTM; Future    2. Pharyngitis, unspecified etiology    - POCT SARS-COV Antigen KELLI (Symptomatic only)  - SARS-CoV-2 PCR (24 hour In-House): Collect NP swab in VTM; Future    3. Eustachian tube dysfunction, right      AVS handout given and reviewed with patient. Pt educated on red flags and when to seek treatment back in ER or UC.

## 2022-08-11 ENCOUNTER — OFFICE VISIT (OUTPATIENT)
Dept: URGENT CARE | Facility: PHYSICIAN GROUP | Age: 62
End: 2022-08-11
Payer: COMMERCIAL

## 2022-08-11 VITALS
BODY MASS INDEX: 28.16 KG/M2 | TEMPERATURE: 98 F | HEART RATE: 75 BPM | HEIGHT: 62 IN | RESPIRATION RATE: 16 BRPM | SYSTOLIC BLOOD PRESSURE: 116 MMHG | WEIGHT: 153 LBS | OXYGEN SATURATION: 98 % | DIASTOLIC BLOOD PRESSURE: 70 MMHG

## 2022-08-11 DIAGNOSIS — J06.9 UPPER RESPIRATORY TRACT INFECTION, UNSPECIFIED TYPE: ICD-10-CM

## 2022-08-11 DIAGNOSIS — J02.9 PHARYNGITIS, UNSPECIFIED ETIOLOGY: ICD-10-CM

## 2022-08-11 PROCEDURE — 99213 OFFICE O/P EST LOW 20 MIN: CPT | Performed by: STUDENT IN AN ORGANIZED HEALTH CARE EDUCATION/TRAINING PROGRAM

## 2022-08-11 RX ORDER — AZITHROMYCIN 250 MG/1
TABLET, FILM COATED ORAL
Qty: 6 TABLET | Refills: 0 | Status: SHIPPED | OUTPATIENT
Start: 2022-08-11 | End: 2023-12-05

## 2022-08-11 ASSESSMENT — ENCOUNTER SYMPTOMS
ABDOMINAL PAIN: 0
HEADACHES: 0
CONSTIPATION: 0
SHORTNESS OF BREATH: 0
PALPITATIONS: 0
DIARRHEA: 0
SORE THROAT: 1
DIZZINESS: 0
CHILLS: 0
SINUS PAIN: 0
MUSCULOSKELETAL NEGATIVE: 1
COUGH: 0
NAUSEA: 1
SPUTUM PRODUCTION: 0
WHEEZING: 0
VOMITING: 0
FEVER: 1

## 2022-08-11 NOTE — PROGRESS NOTES
"Subjective     Elizabeth Madel Schwab is a 62 y.o. female who presents with Headache (Sore throat, right ear pain, nausea, fever, came to  for same cc, pt is not feeling better )            Patient presents today for sore throat throat and right ear pain.  She states symptoms started last Thursday. She was seen in urgent care on 8/9/22. POCT Covid and PCR Covid was negative.  She states she was advised to return to care if symptoms do not improve and that antibiotics may be neccessary so she presents today. Patient has been taking sudafed and tylenol at home which have not provided relief.       Review of Systems   Constitutional:  Positive for fever. Negative for chills and malaise/fatigue.   HENT:  Positive for ear pain and sore throat. Negative for congestion and sinus pain.    Respiratory:  Negative for cough, sputum production, shortness of breath and wheezing.    Cardiovascular:  Negative for chest pain and palpitations.   Gastrointestinal:  Positive for nausea. Negative for abdominal pain, constipation, diarrhea and vomiting.   Genitourinary: Negative.    Musculoskeletal: Negative.    Neurological:  Negative for dizziness and headaches.            Objective     /70 (BP Location: Left arm, Patient Position: Sitting, BP Cuff Size: Adult)   Pulse 75   Temp 36.7 °C (98 °F) (Temporal)   Resp 16   Ht 1.575 m (5' 2\")   Wt 69.4 kg (153 lb)   SpO2 98%   BMI 27.98 kg/m²      Physical Exam  Vitals reviewed.   Constitutional:       Appearance: Normal appearance.   HENT:      Right Ear: Tympanic membrane, ear canal and external ear normal.      Left Ear: Ear canal and external ear normal.      Nose: Congestion and rhinorrhea present.      Mouth/Throat:      Mouth: Mucous membranes are moist.      Pharynx: Oropharynx is clear. Uvula midline. Posterior oropharyngeal erythema present.      Tonsils: No tonsillar exudate. 1+ on the right. 1+ on the left.   Eyes:      Conjunctiva/sclera: Conjunctivae normal.      " Pupils: Pupils are equal, round, and reactive to light.   Cardiovascular:      Rate and Rhythm: Normal rate and regular rhythm.      Heart sounds: Normal heart sounds.   Pulmonary:      Effort: Pulmonary effort is normal.      Breath sounds: Normal breath sounds.   Skin:     General: Skin is warm and dry.      Capillary Refill: Capillary refill takes less than 2 seconds.   Neurological:      General: No focal deficit present.      Mental Status: She is alert.                           Assessment & Plan        1. Upper respiratory tract infection, unspecified type  - azithromycin (ZITHROMAX) 250 MG Tab; Take 2 tablets (500 mg) PO on day 1 and then take 1 tablet (250 mg) daily on 2-5  Dispense: 6 Tablet; Refill: 0    2. Pharyngitis, unspecified etiology  - azithromycin (ZITHROMAX) 250 MG Tab; Take 2 tablets (500 mg) PO on day 1 and then take 1 tablet (250 mg) daily on 2-5  Dispense: 6 Tablet; Refill: 0     Patient presents today after being seen in urgent care previously on 8/9.  She states she tried supportive measures that were recommended at initial visit and requesting antibiotic treatment today as she was told that if symptoms did not resolve, antibiotics would be indicated.     Patient educated that symptoms should be managed with supportive care and there are no treatments to shorten the clinical course of and typically resolves within 7-10 days. Patient agrees to start Zpack only after 10 days of symptoms if symptoms are still not improving.  Zpack prescribed. Patient to continue supportive measures at home.    Supportive measures may include:  Increase daily water intake and make sure getting adequate rest.  Over-the-counter analgesics and antipyretics (i.e. NSAIDs and acetaminophen) can be used for pain and fever relief as needed.  Mechanical saline irrigation and intranasal saline spray may temporarily improve nasal patency and loosen secretions.   Mucolytics (i.e. Mucinex) can be used thin secretions and  may promote ease of mucus drainage and clearance.  Inhalation of warm/humidified air (steam) may provide relief of symptoms.    I personally reviewed prior external notes and test results pertinent to today's visit.    Differential diagnoses, supportive care, and indications for immediate follow-up discussed with patient. Pathogenesis of diagnosis discussed including typical length and natural progression.      Instructed to return to urgent care or nearest emergency department if symptoms fail to improve, for any change in condition, further concerns, or new concerning symptoms.    Patient states understanding and agrees with the plan of care and discharge instructions.

## 2023-08-28 ENCOUNTER — HOSPITAL ENCOUNTER (OUTPATIENT)
Dept: LAB | Facility: MEDICAL CENTER | Age: 63
End: 2023-08-28
Attending: FAMILY MEDICINE
Payer: COMMERCIAL

## 2023-08-28 LAB
25(OH)D3 SERPL-MCNC: 26 NG/ML (ref 30–100)
ALBUMIN SERPL BCP-MCNC: 4.1 G/DL (ref 3.2–4.9)
ALBUMIN/GLOB SERPL: 1.5 G/DL
ALP SERPL-CCNC: 83 U/L (ref 30–99)
ALT SERPL-CCNC: 9 U/L (ref 2–50)
ANION GAP SERPL CALC-SCNC: 11 MMOL/L (ref 7–16)
AST SERPL-CCNC: 21 U/L (ref 12–45)
BILIRUB SERPL-MCNC: 0.5 MG/DL (ref 0.1–1.5)
BUN SERPL-MCNC: 13 MG/DL (ref 8–22)
CALCIUM ALBUM COR SERPL-MCNC: 9.3 MG/DL (ref 8.5–10.5)
CALCIUM SERPL-MCNC: 9.4 MG/DL (ref 8.5–10.5)
CHLORIDE SERPL-SCNC: 103 MMOL/L (ref 96–112)
CHOLEST SERPL-MCNC: 206 MG/DL (ref 100–199)
CO2 SERPL-SCNC: 25 MMOL/L (ref 20–33)
CREAT SERPL-MCNC: 0.76 MG/DL (ref 0.5–1.4)
FASTING STATUS PATIENT QL REPORTED: NORMAL
GFR SERPLBLD CREATININE-BSD FMLA CKD-EPI: 87 ML/MIN/1.73 M 2
GLOBULIN SER CALC-MCNC: 2.8 G/DL (ref 1.9–3.5)
GLUCOSE SERPL-MCNC: 78 MG/DL (ref 65–99)
HDLC SERPL-MCNC: 48 MG/DL
LDLC SERPL CALC-MCNC: 134 MG/DL
POTASSIUM SERPL-SCNC: 4.3 MMOL/L (ref 3.6–5.5)
PROT SERPL-MCNC: 6.9 G/DL (ref 6–8.2)
SODIUM SERPL-SCNC: 139 MMOL/L (ref 135–145)
TRIGL SERPL-MCNC: 122 MG/DL (ref 0–149)

## 2023-08-28 PROCEDURE — 80053 COMPREHEN METABOLIC PANEL: CPT

## 2023-08-28 PROCEDURE — 80061 LIPID PANEL: CPT

## 2023-08-28 PROCEDURE — 36415 COLL VENOUS BLD VENIPUNCTURE: CPT

## 2023-08-28 PROCEDURE — 82306 VITAMIN D 25 HYDROXY: CPT

## 2023-12-05 ENCOUNTER — OFFICE VISIT (OUTPATIENT)
Dept: URGENT CARE | Facility: PHYSICIAN GROUP | Age: 63
End: 2023-12-05
Payer: COMMERCIAL

## 2023-12-05 ENCOUNTER — APPOINTMENT (OUTPATIENT)
Dept: RADIOLOGY | Facility: IMAGING CENTER | Age: 63
End: 2023-12-05
Attending: PHYSICIAN ASSISTANT
Payer: COMMERCIAL

## 2023-12-05 VITALS
BODY MASS INDEX: 29.26 KG/M2 | DIASTOLIC BLOOD PRESSURE: 80 MMHG | RESPIRATION RATE: 14 BRPM | HEART RATE: 75 BPM | WEIGHT: 159 LBS | TEMPERATURE: 98.2 F | SYSTOLIC BLOOD PRESSURE: 134 MMHG | OXYGEN SATURATION: 97 % | HEIGHT: 62 IN

## 2023-12-05 DIAGNOSIS — J98.8 RTI (RESPIRATORY TRACT INFECTION): ICD-10-CM

## 2023-12-05 LAB
FLUAV RNA SPEC QL NAA+PROBE: NEGATIVE
FLUBV RNA SPEC QL NAA+PROBE: NEGATIVE
RSV RNA SPEC QL NAA+PROBE: NEGATIVE
SARS-COV-2 RNA RESP QL NAA+PROBE: NEGATIVE

## 2023-12-05 PROCEDURE — 99214 OFFICE O/P EST MOD 30 MIN: CPT | Performed by: PHYSICIAN ASSISTANT

## 2023-12-05 PROCEDURE — 3079F DIAST BP 80-89 MM HG: CPT | Performed by: PHYSICIAN ASSISTANT

## 2023-12-05 PROCEDURE — 0241U POCT CEPHEID COV-2, FLU A/B, RSV - PCR: CPT | Performed by: PHYSICIAN ASSISTANT

## 2023-12-05 PROCEDURE — 3075F SYST BP GE 130 - 139MM HG: CPT | Performed by: PHYSICIAN ASSISTANT

## 2023-12-05 PROCEDURE — 71046 X-RAY EXAM CHEST 2 VIEWS: CPT | Mod: TC | Performed by: RADIOLOGY

## 2023-12-05 RX ORDER — MOMETASONE FUROATE 50 UG/1
SPRAY, METERED NASAL
COMMUNITY
Start: 2023-11-09 | End: 2023-12-05

## 2023-12-05 RX ORDER — CLARITHROMYCIN 500 MG/1
500 TABLET, COATED ORAL EVERY 12 HOURS
COMMUNITY
Start: 2023-10-18 | End: 2023-12-05

## 2023-12-05 ASSESSMENT — ENCOUNTER SYMPTOMS
HEADACHES: 0
CHILLS: 0
DIZZINESS: 0
ABDOMINAL PAIN: 0
SHORTNESS OF BREATH: 0
FEVER: 0
VOMITING: 0
SORE THROAT: 0
COUGH: 1
NAUSEA: 0
MYALGIAS: 0
BACK PAIN: 1

## 2023-12-05 NOTE — PROGRESS NOTES
Subjective:     CHIEF COMPLAINT  Chief Complaint   Patient presents with    Cough     Dry cough, back pain, x1.5 week       HPI  Elizabeth Madel Schwab is a very pleasant 63 y.o. female who presents to the clinic with URI-like symptoms x7-10 days.  Patient states over the first week she was primarily experiencing sinus congestion, runny nose and frequent sneezing.  Over the last 3 days she developed a dry cough.  Cough has been fairly persistent.  Denies any associated shortness of breath.  This morning she woke up with right-sided thoracic back pain.  Pain aggravated with certain movements as well as coughing.  Denies any recent fever.   was experiencing similar symptoms however has been gradually improving.  Currently taking Tylenol and Advil for symptoms.  Patient requesting RSV testing.    REVIEW OF SYSTEMS  Review of Systems   Constitutional:  Negative for chills, fever and malaise/fatigue.   HENT:  Positive for congestion. Negative for sore throat.    Respiratory:  Positive for cough. Negative for shortness of breath.    Gastrointestinal:  Negative for abdominal pain, nausea and vomiting.   Musculoskeletal:  Positive for back pain. Negative for myalgias.   Skin:  Negative for rash.   Neurological:  Negative for dizziness and headaches.       PAST MEDICAL HISTORY  Patient Active Problem List    Diagnosis Date Noted    EBV infection 05/17/2016    Sleep apnea 05/17/2016    Overweight 05/17/2016    Dyslipidemia 05/17/2016    Vitamin D deficiency 05/17/2016    Tobacco abuse 05/17/2016    Postmenopausal status 05/17/2016    Tobacco dependence syndrome 02/18/2016    Essential hypertension 10/09/2015    Postmenopausal HRT (hormone replacement therapy) 09/09/2014       SURGICAL HISTORY   has a past surgical history that includes abdominal hysterectomy total.    ALLERGIES  Allergies   Allergen Reactions    Nitrofurantoin     Penicillins     Sulfa Drugs        CURRENT MEDICATIONS  Home Medications       Reviewed  "by Jonathon Feliz P.A.-C. (Physician Assistant) on 12/05/23 at 1044  Med List Status: <None>     Medication Last Dose Status   albuterol 108 (90 Base) MCG/ACT Aero Soln inhalation aerosol  Active   azithromycin (ZITHROMAX) 250 MG Tab Not Taking Active   benazepril-hydrochlorthiazide (LOTENSIN HCT) 20-12.5 MG per tablet Taking Active   clarithromycin (BIAXIN) 500 MG Tab Not Taking Active   estradiol (ESTRACE) 1 MG Tab Taking Active   mometasone (NASONEX) 50 MCG/ACT nasal spray Not Taking Active                    SOCIAL HISTORY  Social History     Tobacco Use    Smoking status: Every Day     Current packs/day: 0.50     Types: Cigarettes    Smokeless tobacco: Never    Tobacco comments:     1-2 day, intermittent use   Substance and Sexual Activity    Alcohol use: No    Drug use: No    Sexual activity: Yes     Partners: Male       FAMILY HISTORY  Family History   Problem Relation Age of Onset    Dementia Mother     Arthritis Mother     Heart Attack Father     Heart Disease Father           Objective:     VITAL SIGNS: /80 (BP Location: Right arm, Patient Position: Sitting, BP Cuff Size: Adult)   Pulse 75   Temp 36.8 °C (98.2 °F) (Temporal)   Resp 14   Ht 1.575 m (5' 2\")   Wt 72.1 kg (159 lb)   SpO2 97%   BMI 29.08 kg/m²     PHYSICAL EXAM  Physical Exam  Constitutional:       General: She is not in acute distress.     Appearance: Normal appearance. She is not ill-appearing, toxic-appearing or diaphoretic.   HENT:      Head: Normocephalic and atraumatic.      Right Ear: Tympanic membrane, ear canal and external ear normal.      Left Ear: Tympanic membrane, ear canal and external ear normal.      Nose: Congestion present. No rhinorrhea.      Mouth/Throat:      Mouth: Mucous membranes are moist.      Pharynx: No oropharyngeal exudate or posterior oropharyngeal erythema.   Eyes:      Conjunctiva/sclera: Conjunctivae normal.   Cardiovascular:      Rate and Rhythm: Normal rate and regular rhythm.      Pulses: " Normal pulses.      Heart sounds: Normal heart sounds.   Pulmonary:      Effort: Pulmonary effort is normal.      Breath sounds: No wheezing, rhonchi or rales.   Musculoskeletal:      Cervical back: Normal range of motion.   Neurological:      General: No focal deficit present.      Mental Status: She is alert and oriented to person, place, and time. Mental status is at baseline.       RADIOLOGY RESULTS   DX-CHEST-2 VIEWS    Result Date: 12/5/2023 12/5/2023 11:02 AM HISTORY/REASON FOR EXAM:  Cough TECHNIQUE/EXAM DESCRIPTION: PA and lateral views of the chest. COMPARISON:  Chest x-ray 10/1/2021 FINDINGS: The lungs are clear. The cardiac silhouette is normal in size. No effusions or pneumothoraces are present. There are no significant osseous abnormalities. The visualized portions of the upper abdomen are within normal limits.     NEGATIVE TWO VIEWS OF THE CHEST.       Lab Results/POC Test Results   Results for orders placed or performed in visit on 12/05/23   POCT CoV-2, Flu A/B, RSV by PCR   Result Value Ref Range    SARS-CoV-2 by PCR Negative Negative, Invalid    Influenza virus A RNA Negative Negative, Invalid    Influenza virus B, PCR Negative Negative, Invalid    RSV, PCR Negative Negative, Invalid           Assessment/Plan:     1. RTI (respiratory tract infection)  DX-CHEST-2 VIEWS    POCT CoV-2, Flu A/B, RSV by PCR          MDM/Comments:    Patient's viral test returned negative.  Chest x-ray performed without any evidence of cardiopulmonary abnormality.  On exam the patient's lung sounds are clear to auscultation.  No wheezes rhonchi or rales.  SPO2 97% on room air.  She has been afebrile.  Discussed likely viral etiology.  Continued supportive recommendations were discussed.  Offered sending cough medication and albuterol patient respectfully declined.    Differential diagnosis, natural history, supportive care, and indications for immediate follow-up discussed. All questions answered. Patient agrees with  the plan of care.    Follow-up as needed if symptoms worsen or fail to improve to PCP, Urgent care or Emergency Room.    I have personally reviewed prior external notes and test results pertinent to today's visit.  I have independently reviewed and interpreted all diagnostics ordered during this urgent care acute visit.   Discussed management options (risks,benefits, and alternatives to treatment). Pt expresses understanding and the treatment plan was agreed upon. Questions were encouraged and answered to pt's satisfaction.    Please note that this dictation was created using voice recognition software. I have made a reasonable attempt to correct obvious errors, but I expect that there are errors of grammar and possibly content that I did not discover before finalizing the note.

## 2024-01-09 ENCOUNTER — PATIENT MESSAGE (OUTPATIENT)
Dept: HEALTH INFORMATION MANAGEMENT | Facility: OTHER | Age: 64
End: 2024-01-09

## 2024-01-17 ENCOUNTER — OFFICE VISIT (OUTPATIENT)
Dept: URGENT CARE | Facility: PHYSICIAN GROUP | Age: 64
End: 2024-01-17
Payer: COMMERCIAL

## 2024-01-17 VITALS
HEIGHT: 62 IN | BODY MASS INDEX: 29.44 KG/M2 | DIASTOLIC BLOOD PRESSURE: 70 MMHG | RESPIRATION RATE: 14 BRPM | SYSTOLIC BLOOD PRESSURE: 128 MMHG | HEART RATE: 68 BPM | OXYGEN SATURATION: 96 % | WEIGHT: 160 LBS | TEMPERATURE: 97.7 F

## 2024-01-17 DIAGNOSIS — R05.1 ACUTE COUGH: ICD-10-CM

## 2024-01-17 DIAGNOSIS — F17.200 TOBACCO DEPENDENCE SYNDROME: ICD-10-CM

## 2024-01-17 DIAGNOSIS — J22 LRTI (LOWER RESPIRATORY TRACT INFECTION): Primary | ICD-10-CM

## 2024-01-17 PROCEDURE — 3078F DIAST BP <80 MM HG: CPT | Performed by: NURSE PRACTITIONER

## 2024-01-17 PROCEDURE — 3074F SYST BP LT 130 MM HG: CPT | Performed by: NURSE PRACTITIONER

## 2024-01-17 PROCEDURE — 99214 OFFICE O/P EST MOD 30 MIN: CPT | Performed by: NURSE PRACTITIONER

## 2024-01-17 RX ORDER — BENZONATATE 100 MG/1
100 CAPSULE ORAL 3 TIMES DAILY PRN
Qty: 60 CAPSULE | Refills: 0 | Status: SHIPPED | OUTPATIENT
Start: 2024-01-17

## 2024-01-17 RX ORDER — DOXYCYCLINE HYCLATE 100 MG/1
100 CAPSULE ORAL 2 TIMES DAILY
Qty: 20 CAPSULE | Refills: 0 | Status: SHIPPED | OUTPATIENT
Start: 2024-01-17 | End: 2024-01-27

## 2024-01-17 RX ORDER — MOMETASONE FUROATE 50 UG/1
SPRAY, METERED NASAL
COMMUNITY
Start: 2024-01-10

## 2024-01-17 NOTE — PROGRESS NOTES
Elizabeth Madel Schwab is a 63 y.o. female who presents for Cough (Cough with blood this morning, cough has been going on for 1.5 weeks )      HPI  This is a new problem. Elizabeth Madel Schwab is a 63 y.o. patient who presents to urgent care with c/o:  coughing for 1 1/2 weeks. Feels intermittently feverish. Fatigue and body aches. Generally does not feel well. Mild STOVALL.   TX TRIED: tylenol, cough drops, mucinex. - nothing is helping.   + cigarette user  currently 1/2 ppd after cutting back recently .         ROS See HPI    Allergies:       Allergies   Allergen Reactions    Nitrofurantoin     Penicillins     Sulfa Drugs        PMSFS Hx:  Past Medical History:   Diagnosis Date    CAP (community acquired pneumonia)     EBV infection 5/17/2016    Hypertension     Kidney disease     Postmenopausal HRT (hormone replacement therapy)     Shingles     L SHOULDER    Vitamin D deficiency 5/17/2016     Past Surgical History:   Procedure Laterality Date    ABDOMINAL HYSTERECTOMY TOTAL       Family History   Problem Relation Age of Onset    Dementia Mother     Arthritis Mother     Heart Attack Father     Heart Disease Father      Social History     Tobacco Use    Smoking status: Every Day     Current packs/day: 0.50     Types: Cigarettes    Smokeless tobacco: Never    Tobacco comments:     1-2 day, intermittent use   Substance Use Topics    Alcohol use: No       Problems:   Patient Active Problem List   Diagnosis    Postmenopausal HRT (hormone replacement therapy)    Essential hypertension    EBV infection    Sleep apnea    Overweight    Dyslipidemia    Vitamin D deficiency    Tobacco abuse    Postmenopausal status    Tobacco dependence syndrome       Medications:   Current Outpatient Medications on File Prior to Visit   Medication Sig Dispense Refill    mometasone (NASONEX) 50 MCG/ACT nasal spray SPRAY 2 SPRAYS INTO EACH NOSTRIL EVERY DAY      benazepril-hydrochlorthiazide (LOTENSIN HCT) 20-12.5 MG per tablet TAKE 1 TAB BY  "MOUTH EVERY DAY. 30 Tab 0    estradiol (ESTRACE) 1 MG Tab TAKE 1 TAB BY MOUTH EVERY DAY. 30 Tab 0     No current facility-administered medications on file prior to visit.        Objective:     /70 (BP Location: Right arm, Patient Position: Sitting, BP Cuff Size: Adult)   Pulse 68   Temp 36.5 °C (97.7 °F) (Temporal)   Resp 14   Ht 1.575 m (5' 2\")   Wt 72.6 kg (160 lb)   SpO2 96%   BMI 29.26 kg/m²     Physical Exam  Vitals and nursing note reviewed.   Constitutional:       General: She is not in acute distress.     Appearance: Normal appearance. She is well-developed. She is not ill-appearing or toxic-appearing.   HENT:      Right Ear: Hearing normal. No middle ear effusion. Tympanic membrane is injected. Tympanic membrane is not erythematous.      Left Ear: Hearing normal.  No middle ear effusion. Tympanic membrane is injected. Tympanic membrane is not erythematous.      Nose: Congestion present. No mucosal edema or rhinorrhea.      Right Sinus: No maxillary sinus tenderness or frontal sinus tenderness.      Left Sinus: No maxillary sinus tenderness or frontal sinus tenderness.      Mouth/Throat:      Pharynx: Uvula midline. No posterior oropharyngeal erythema.      Tonsils: No tonsillar abscesses.   Neck:      Trachea: Trachea normal.   Cardiovascular:      Rate and Rhythm: Normal rate and regular rhythm.      Chest Wall: PMI is not displaced.      Pulses: Normal pulses.      Heart sounds: Normal heart sounds.   Pulmonary:      Effort: Pulmonary effort is normal. No accessory muscle usage or respiratory distress.      Breath sounds: Rhonchi present. No decreased breath sounds, wheezing or rales.      Comments: Speaks in full and complete sentences without distress.   Musculoskeletal:         General: Normal range of motion.      Cervical back: Full passive range of motion without pain, normal range of motion and neck supple.   Lymphadenopathy:      Cervical: No cervical adenopathy.      Upper Body:      " Right upper body: No supraclavicular adenopathy.      Left upper body: No supraclavicular adenopathy.   Skin:     General: Skin is warm and dry.      Capillary Refill: Capillary refill takes less than 2 seconds.   Neurological:      Mental Status: She is alert and oriented to person, place, and time.      Gait: Gait normal.   Psychiatric:         Mood and Affect: Mood normal.         Speech: Speech normal.         Behavior: Behavior normal. Behavior is cooperative.         Assessment /Associated Orders:      1. LRTI (lower respiratory tract infection)  doxycycline (VIBRAMYCIN) 100 MG Cap      2. Acute cough  benzonatate (TESSALON) 100 MG Cap      3. Tobacco dependence syndrome              Medical Decision Making:    Isabelle is a very pleasant 63 y.o. female who is clinically stable at today's acute urgent care visit.  No acute distress noted.  VSS. Appropriate for outpatient care at this time.   Acute problem today with uncertain prognosis.   Educated in proper administration of  prescription medication(s) ordered today including safety, possible SE, risks, benefits, rationale and alternatives to therapy.   Keep well hydrated  Educated on basic health risks  associated with tobacco, nicotine, and vaping products. Verbalized understanding of risks. Currently using tobacco products daily.  . Declines further information or assistance at this time. Declines referral to tobacco cessation program.   Recommend FU with PCP for further management of smoking cessation if desired.     Discussed Dx, management options (risks,benefits, and alternatives to planned treatment), natural progression and supportive care.  Expressed understanding and the treatment plan was agreed upon.   Questions were encouraged and answered   Return to urgent care prn if new or worsening sx or if there is no improvement in condition prn.    Educated in Red flags and indications to immediately call 911 or present to the Emergency Department.        Time I spent evaluating Elizabeth Madel Schwab in urgent care today was 31  minutes. This time includes preparing for visit, reviewing any pertinent notes or test results, counseling/education, exam, obtaining HPI, interpretation of lab tests, medication management and documentation as indicated above.Time does not include separately billable procedures noted .       Please note that this dictation was created using voice recognition software. I have worked with consultants from the vendor as well as technical experts from [facility] to optimize the interface. I have made every reasonable attempt to correct obvious errors, but I expect that there are errors of grammar and possibly content that I did not discover before finalizing the note.  This note was electronically signed by provider

## 2024-01-26 ENCOUNTER — APPOINTMENT (OUTPATIENT)
Dept: RADIOLOGY | Facility: IMAGING CENTER | Age: 64
End: 2024-01-26
Payer: COMMERCIAL

## 2024-01-26 ENCOUNTER — OFFICE VISIT (OUTPATIENT)
Dept: URGENT CARE | Facility: PHYSICIAN GROUP | Age: 64
End: 2024-01-26
Payer: COMMERCIAL

## 2024-01-26 ENCOUNTER — HOSPITAL ENCOUNTER (OUTPATIENT)
Facility: MEDICAL CENTER | Age: 64
End: 2024-01-26
Payer: COMMERCIAL

## 2024-01-26 VITALS
DIASTOLIC BLOOD PRESSURE: 90 MMHG | RESPIRATION RATE: 20 BRPM | SYSTOLIC BLOOD PRESSURE: 158 MMHG | TEMPERATURE: 97.4 F | OXYGEN SATURATION: 97 % | HEIGHT: 62 IN | BODY MASS INDEX: 29.44 KG/M2 | HEART RATE: 95 BPM | WEIGHT: 160 LBS

## 2024-01-26 DIAGNOSIS — J06.9 UPPER RESPIRATORY TRACT INFECTION, UNSPECIFIED TYPE: ICD-10-CM

## 2024-01-26 LAB
FLUAV RNA SPEC QL NAA+PROBE: NEGATIVE
FLUBV RNA SPEC QL NAA+PROBE: NEGATIVE
RSV RNA SPEC QL NAA+PROBE: NEGATIVE
SARS-COV-2 RNA RESP QL NAA+PROBE: NOTDETECTED
SPECIMEN SOURCE: NORMAL

## 2024-01-26 PROCEDURE — 99213 OFFICE O/P EST LOW 20 MIN: CPT

## 2024-01-26 PROCEDURE — 3077F SYST BP >= 140 MM HG: CPT

## 2024-01-26 PROCEDURE — 3080F DIAST BP >= 90 MM HG: CPT

## 2024-01-26 PROCEDURE — 71046 X-RAY EXAM CHEST 2 VIEWS: CPT | Mod: TC

## 2024-01-26 PROCEDURE — 0241U HCHG SARS-COV-2 COVID-19 NFCT DS RESP RNA 4 TRGT MIC: CPT

## 2024-01-26 RX ORDER — PREDNISONE 10 MG/1
40 TABLET ORAL DAILY
Qty: 20 TABLET | Refills: 0 | Status: SHIPPED | OUTPATIENT
Start: 2024-01-26 | End: 2024-01-31

## 2024-01-26 RX ORDER — ALBUTEROL SULFATE 90 UG/1
2 AEROSOL, METERED RESPIRATORY (INHALATION) EVERY 6 HOURS PRN
Qty: 8.5 G | Refills: 1 | Status: SHIPPED | OUTPATIENT
Start: 2024-01-26

## 2024-01-26 RX ORDER — ONDANSETRON 4 MG/1
4 TABLET, ORALLY DISINTEGRATING ORAL EVERY 6 HOURS PRN
Qty: 15 TABLET | Refills: 0 | Status: SHIPPED | OUTPATIENT
Start: 2024-01-26

## 2024-01-26 RX ORDER — DEXTROMETHORPHAN HYDROBROMIDE AND PROMETHAZINE HYDROCHLORIDE 15; 6.25 MG/5ML; MG/5ML
5 SYRUP ORAL EVERY 6 HOURS PRN
Qty: 100 ML | Refills: 0 | Status: SHIPPED | OUTPATIENT
Start: 2024-01-26

## 2024-01-26 NOTE — PROGRESS NOTES
"Chief Complaint   Patient presents with    Follow-Up     Pt was here before and she states that she hasn't gotten better.          Subjective:   HISTORY OF PRESENT ILLNESS: Elizabeth Madel Schwab is a 63 y.o. female who presents for cough that is worseing x 2 weeks She finished a full course of antibx and never really felt much improvement.  She reports a few days ago she actually worsened again and started having increased body aches.  She also reports that she had nausea and vomiting this morning.    Patient denies recent fevers although she feels chills.     Medications, Allergies, current problem list, Social and Family history reviewed today in Epic.     Objective:     BP (!) 158/90 (BP Location: Left arm, Patient Position: Sitting, BP Cuff Size: Adult)   Pulse 95   Temp 36.3 °C (97.4 °F) (Temporal)   Resp 20   Ht 1.575 m (5' 2\")   Wt 72.6 kg (160 lb)   SpO2 97%     Physical Exam  Vitals reviewed.   Constitutional:       Appearance: Normal appearance. She is not toxic-appearing.   HENT:      Head:      Jaw: No trismus.      Right Ear: Tympanic membrane normal.      Left Ear: Tympanic membrane normal.      Nose: Rhinorrhea present.      Mouth/Throat:      Mouth: Mucous membranes are moist.      Pharynx: Uvula midline. Posterior oropharyngeal erythema present. No pharyngeal swelling, oropharyngeal exudate or uvula swelling.      Tonsils: No tonsillar exudate or tonsillar abscesses. 1+ on the right. 1+ on the left.      Comments: No muffled voice, trismus, unilateral deviation of the uvula, soft palate fullness or edema. No oral airway compromise, or drooling noted.   Eyes:      Conjunctiva/sclera: Conjunctivae normal.   Cardiovascular:      Rate and Rhythm: Normal rate and regular rhythm.      Heart sounds: Normal heart sounds.   Pulmonary:      Effort: Pulmonary effort is normal. No respiratory distress.      Breath sounds: Normal breath sounds. No decreased breath sounds or wheezing.   Musculoskeletal:    "   Cervical back: Full passive range of motion without pain and neck supple.   Skin:     General: Skin is warm and dry.   Neurological:      Mental Status: She is alert and oriented to person, place, and time.   Psychiatric:         Mood and Affect: Mood normal.          Assessment/Plan:     Diagnosis and associated orders    I personally reviewed prior external notes and test results pertinent to today's visit.     1. Upper respiratory tract infection, unspecified type  DX-CHEST-2 VIEWS    predniSONE (DELTASONE) 10 MG Tab    promethazine-dextromethorphan (PROMETHAZINE-DM) 6.25-15 MG/5ML syrup    albuterol 108 (90 Base) MCG/ACT Aero Soln inhalation aerosol    ondansetron (ZOFRAN ODT) 4 MG TABLET DISPERSIBLE    CoV-2, Flu A/B, And RSV by PCR ([employer])        .Today's radiology imaging personally reviewed by me today on day of visit and Radiology readings reviewed and discussed w/ patient today.     RADIOLOGY RESULTS   DX-CHEST-2 VIEWS    Result Date: 1/26/2024 1/26/2024 3:24 PM HISTORY/REASON FOR EXAM:  Productive cough for 10 days. TECHNIQUE/EXAM DESCRIPTION AND NUMBER OF VIEWS: Two views of the chest. COMPARISON:  Chest radiography, 12/5/2023. FINDINGS: The heart is normal in size. No pulmonary infiltrates or consolidations are noted. No pleural effusions are appreciated. No visible pneumothorax.     No radiographic evidence of acute cardiopulmonary disease.             IMPRESSION:  Pt has stable vital signs and no red flag symptoms identified. Exam reveals clear lung sounds bilaterally... her chest xray was revived by me and discussed with pt.   Informed pt that symptoms are consistent with post viral cough syndrome.  PT is an active smoker and smells very strongly of cigarette smoke.  I did advise her that she should refrain from smoking while sick.  Advised to start a course of steriods and an albuterol inhaler.  There is a chance this could be a new onset viral illness so a viral swab was sent out.   If she has  no improvement in her symptoms after she trials the steriods she should present to the ED for failed outpt therapy.     Differential diagnosis discussed. Pt was Educated on red flag symptoms. Pt has been Instructed to return to Urgent Care or nearest Emergency Department if symptoms fail to improve, for any change in condition, further concerns, or new concerning symptoms. Patient states understanding of the plan of care and discharge instructions.  They are discharged in stable condition.         Please note that this dictation was created using voice recognition software. I have made a reasonable attempt to correct obvious errors, but I expect that there are errors of grammar and possibly content that I did not discover before finalizing the note.    This note was electronically signed by JOSELINE Pike

## 2024-02-02 ENCOUNTER — TELEPHONE (OUTPATIENT)
Dept: URGENT CARE | Facility: PHYSICIAN GROUP | Age: 64
End: 2024-02-02

## 2024-04-07 ENCOUNTER — HOSPITAL ENCOUNTER (OUTPATIENT)
Facility: MEDICAL CENTER | Age: 64
End: 2024-04-07
Attending: FAMILY MEDICINE
Payer: COMMERCIAL

## 2024-04-07 ENCOUNTER — OFFICE VISIT (OUTPATIENT)
Dept: URGENT CARE | Facility: PHYSICIAN GROUP | Age: 64
End: 2024-04-07
Payer: COMMERCIAL

## 2024-04-07 VITALS
HEART RATE: 81 BPM | TEMPERATURE: 97.5 F | RESPIRATION RATE: 18 BRPM | SYSTOLIC BLOOD PRESSURE: 120 MMHG | BODY MASS INDEX: 29.26 KG/M2 | OXYGEN SATURATION: 99 % | HEIGHT: 62 IN | WEIGHT: 159 LBS | DIASTOLIC BLOOD PRESSURE: 84 MMHG

## 2024-04-07 DIAGNOSIS — I10 ESSENTIAL HYPERTENSION: ICD-10-CM

## 2024-04-07 DIAGNOSIS — Z87.442 HISTORY OF KIDNEY STONES: ICD-10-CM

## 2024-04-07 DIAGNOSIS — R10.9 BELLY PAIN: ICD-10-CM

## 2024-04-07 LAB
APPEARANCE UR: NORMAL
BILIRUB UR STRIP-MCNC: NEGATIVE MG/DL
COLOR UR AUTO: YELLOW
GLUCOSE UR STRIP.AUTO-MCNC: NEGATIVE MG/DL
KETONES UR STRIP.AUTO-MCNC: NEGATIVE MG/DL
LEUKOCYTE ESTERASE UR QL STRIP.AUTO: NEGATIVE
NITRITE UR QL STRIP.AUTO: NEGATIVE
PH UR STRIP.AUTO: 6.5 [PH] (ref 5–8)
PROT UR QL STRIP: NEGATIVE MG/DL
RBC UR QL AUTO: NEGATIVE
SP GR UR STRIP.AUTO: 1.02
UROBILINOGEN UR STRIP-MCNC: 0.2 MG/DL

## 2024-04-07 PROCEDURE — 87086 URINE CULTURE/COLONY COUNT: CPT

## 2024-04-07 PROCEDURE — 3079F DIAST BP 80-89 MM HG: CPT | Performed by: FAMILY MEDICINE

## 2024-04-07 PROCEDURE — 3074F SYST BP LT 130 MM HG: CPT | Performed by: FAMILY MEDICINE

## 2024-04-07 PROCEDURE — 81002 URINALYSIS NONAUTO W/O SCOPE: CPT | Performed by: FAMILY MEDICINE

## 2024-04-07 PROCEDURE — 99214 OFFICE O/P EST MOD 30 MIN: CPT | Performed by: FAMILY MEDICINE

## 2024-04-07 RX ORDER — CIPROFLOXACIN 500 MG/1
500 TABLET, FILM COATED ORAL 2 TIMES DAILY
Qty: 14 TABLET | Refills: 0 | Status: SHIPPED | OUTPATIENT
Start: 2024-04-07 | End: 2024-04-14

## 2024-04-07 RX ORDER — METRONIDAZOLE 500 MG/1
500 TABLET ORAL EVERY 8 HOURS
Qty: 21 TABLET | Refills: 0 | Status: SHIPPED | OUTPATIENT
Start: 2024-04-07 | End: 2024-04-14

## 2024-04-07 ASSESSMENT — ENCOUNTER SYMPTOMS: ABDOMINAL PAIN: 1

## 2024-04-07 NOTE — PROGRESS NOTES
Subjective     Elizabeth Madel Schwab is a 63 y.o. female who presents with Abdominal Pain (Lower abdominal pain, lower back pain. Burning sensation before and after urination x 2 days.)    - This is a very pleasant 63 y.o. who has come to the walk-in clinic today for approximately 2 days of the lower abdominal pain.  Feels some pain before urination and after urination.  Is little bit of urinary frequency.  Had a little bit of low back pain initially now feels it more in the pelvic area.  No nausea vomiting fevers chills.  No bloody stools.    Hx HTN, stable, well controlled on meds    Hx kidney stones. None recent       ALLERGIES:  Nitrofurantoin, Penicillins, and Sulfa drugs     PMH:  Past Medical History:   Diagnosis Date    CAP (community acquired pneumonia)     EBV infection 5/17/2016    Hypertension     Kidney disease     Postmenopausal HRT (hormone replacement therapy)     Shingles     L SHOULDER    Vitamin D deficiency 5/17/2016        PSH:  Past Surgical History:   Procedure Laterality Date    ABDOMINAL HYSTERECTOMY TOTAL         MEDS:    Current Outpatient Medications:     ciprofloxacin (CIPRO) 500 MG Tab, Take 1 Tablet by mouth 2 times a day for 7 days., Disp: 14 Tablet, Rfl: 0    metroNIDAZOLE (FLAGYL) 500 MG Tab, Take 1 Tablet by mouth every 8 hours for 7 days., Disp: 21 Tablet, Rfl: 0    mometasone (NASONEX) 50 MCG/ACT nasal spray, SPRAY 2 SPRAYS INTO EACH NOSTRIL EVERY DAY, Disp: , Rfl:     benazepril-hydrochlorthiazide (LOTENSIN HCT) 20-12.5 MG per tablet, TAKE 1 TAB BY MOUTH EVERY DAY., Disp: 30 Tab, Rfl: 0    estradiol (ESTRACE) 1 MG Tab, TAKE 1 TAB BY MOUTH EVERY DAY., Disp: 30 Tab, Rfl: 0    ** I have documented what I find to be significant in regards to past medical, social, family and surgical history  in my HPI or under PMH/PSH/FH review section, otherwise it is noncontributory **           HPI    Review of Systems   Gastrointestinal:  Positive for abdominal pain.   Genitourinary:  Positive  "for frequency.   All other systems reviewed and are negative.             Objective     /84 (BP Location: Left arm, Patient Position: Sitting, BP Cuff Size: Adult long)   Pulse 81   Temp 36.4 °C (97.5 °F) (Temporal)   Resp 18   Ht 1.575 m (5' 2\")   Wt 72.1 kg (159 lb)   SpO2 99%   BMI 29.08 kg/m²      Physical Exam  Vitals and nursing note reviewed.   Constitutional:       General: She is not in acute distress.     Appearance: Normal appearance. She is well-developed.   HENT:      Head: Normocephalic.   Cardiovascular:      Heart sounds: Normal heart sounds. No murmur heard.  Pulmonary:      Effort: Pulmonary effort is normal. No respiratory distress.      Breath sounds: Normal breath sounds.   Abdominal:      General: There is no distension.      Palpations: Abdomen is soft.      Tenderness: There is abdominal tenderness (across suprapubic region (mild)). There is no guarding or rebound.   Neurological:      Mental Status: She is alert.      Motor: No abnormal muscle tone.   Psychiatric:         Mood and Affect: Mood normal.         Behavior: Behavior normal.                             Assessment & Plan     1. Belly pain  POCT Urinalysis    URINE CULTURE(NEW)    ciprofloxacin (CIPRO) 500 MG Tab    metroNIDAZOLE (FLAGYL) 500 MG Tab      2. Essential hypertension        3. History of kidney stones            Nonspecific lower abdominal pain.  Does have history of diverticulitis.  Discussed getting imaging and shared decision was made to do trial of antibiotics and if not improving in the next 1 to 2 days or getting worse will return to see her primary care physician or go to ER for full workup.    - Dx, plan & d/c instructions discussed   - Rest, stay hydrated. [place] diet    Follow up with your regular primary care providers office within a week to keep them updated and informed of this visit and for regular routine health maintenance check-ups. ER if not improving in 2-3 days or if feeling/getting " worse. (If you do not have a primary care provider and need to schedule one you may call [facility] at [phone] to do this).      Patient left in stable condition     POCT results reviewed/discussed        Discussed if any testing, labs or imaging studies are obtained outside of the [facility] facility, it is their responsibility to contact the Urgent Care and let us know that it was done and get us the results so adequate follow up can be initiated    Pertinent prior lab work and/or imaging studies in Epic have been reviewed by me today on day of this visit and taken into account for my treatment and plan today    Pertinent PMH/PSH and/or chronic conditions and medications if any were reviewed today and taken into account for my treatment and plan today    Pertinent prior office visit notes in [facility] have been reviewed by me today on day of this visit.    Please note that this dictation may have been created using voice recognition software, if so I have made every reasonable attempt to correct obvious errors, but I expect that there are errors of grammar and possibly content that I did not discover before finalizing the note.

## 2024-04-09 LAB
BACTERIA UR CULT: NORMAL
SIGNIFICANT IND 70042: NORMAL
SITE SITE: NORMAL
SOURCE SOURCE: NORMAL

## 2024-04-17 ENCOUNTER — HOSPITAL ENCOUNTER (OUTPATIENT)
Dept: LAB | Facility: MEDICAL CENTER | Age: 64
End: 2024-04-17
Attending: FAMILY MEDICINE
Payer: COMMERCIAL

## 2024-04-17 LAB
ALBUMIN SERPL BCP-MCNC: 4.1 G/DL (ref 3.2–4.9)
ALBUMIN/GLOB SERPL: 1.5 G/DL
ALP SERPL-CCNC: 85 U/L (ref 30–99)
ALT SERPL-CCNC: 15 U/L (ref 2–50)
ANION GAP SERPL CALC-SCNC: 12 MMOL/L (ref 7–16)
AST SERPL-CCNC: 25 U/L (ref 12–45)
BASOPHILS # BLD AUTO: 1 % (ref 0–1.8)
BASOPHILS # BLD: 0.16 K/UL (ref 0–0.12)
BILIRUB SERPL-MCNC: 0.2 MG/DL (ref 0.1–1.5)
BUN SERPL-MCNC: 12 MG/DL (ref 8–22)
CALCIUM ALBUM COR SERPL-MCNC: 8.7 MG/DL (ref 8.5–10.5)
CALCIUM SERPL-MCNC: 8.8 MG/DL (ref 8.5–10.5)
CHLORIDE SERPL-SCNC: 100 MMOL/L (ref 96–112)
CO2 SERPL-SCNC: 28 MMOL/L (ref 20–33)
CREAT SERPL-MCNC: 0.78 MG/DL (ref 0.5–1.4)
EOSINOPHIL # BLD AUTO: 0.4 K/UL (ref 0–0.51)
EOSINOPHIL NFR BLD: 2.6 % (ref 0–6.9)
ERYTHROCYTE [DISTWIDTH] IN BLOOD BY AUTOMATED COUNT: 55.9 FL (ref 35.9–50)
GFR SERPLBLD CREATININE-BSD FMLA CKD-EPI: 84 ML/MIN/1.73 M 2
GLOBULIN SER CALC-MCNC: 2.7 G/DL (ref 1.9–3.5)
GLUCOSE SERPL-MCNC: 90 MG/DL (ref 65–99)
HCT VFR BLD AUTO: 51 % (ref 37–47)
HGB BLD-MCNC: 16.9 G/DL (ref 12–16)
IMM GRANULOCYTES # BLD AUTO: 0.07 K/UL (ref 0–0.11)
IMM GRANULOCYTES NFR BLD AUTO: 0.5 % (ref 0–0.9)
LYMPHOCYTES # BLD AUTO: 4.37 K/UL (ref 1–4.8)
LYMPHOCYTES NFR BLD: 28.6 % (ref 22–41)
MCH RBC QN AUTO: 31.9 PG (ref 27–33)
MCHC RBC AUTO-ENTMCNC: 33.1 G/DL (ref 32.2–35.5)
MCV RBC AUTO: 96.4 FL (ref 81.4–97.8)
MONOCYTES # BLD AUTO: 1.14 K/UL (ref 0–0.85)
MONOCYTES NFR BLD AUTO: 7.5 % (ref 0–13.4)
NEUTROPHILS # BLD AUTO: 9.13 K/UL (ref 1.82–7.42)
NEUTROPHILS NFR BLD: 59.8 % (ref 44–72)
NRBC # BLD AUTO: 0 K/UL
NRBC BLD-RTO: 0 /100 WBC (ref 0–0.2)
PLATELET # BLD AUTO: 279 K/UL (ref 164–446)
PMV BLD AUTO: 12.4 FL (ref 9–12.9)
POTASSIUM SERPL-SCNC: 3.9 MMOL/L (ref 3.6–5.5)
PROT SERPL-MCNC: 6.8 G/DL (ref 6–8.2)
RBC # BLD AUTO: 5.29 M/UL (ref 4.2–5.4)
SODIUM SERPL-SCNC: 140 MMOL/L (ref 135–145)
WBC # BLD AUTO: 15.3 K/UL (ref 4.8–10.8)

## 2024-04-17 PROCEDURE — 36415 COLL VENOUS BLD VENIPUNCTURE: CPT

## 2024-04-17 PROCEDURE — 80053 COMPREHEN METABOLIC PANEL: CPT

## 2024-04-17 PROCEDURE — 85025 COMPLETE CBC W/AUTO DIFF WBC: CPT

## 2024-04-18 ENCOUNTER — HOSPITAL ENCOUNTER (OUTPATIENT)
Dept: RADIOLOGY | Facility: MEDICAL CENTER | Age: 64
End: 2024-04-18
Attending: FAMILY MEDICINE
Payer: COMMERCIAL

## 2024-04-18 DIAGNOSIS — K57.32 DIVERTICULITIS OF LARGE INTESTINE WITHOUT PERFORATION OR ABSCESS WITHOUT BLEEDING: ICD-10-CM

## 2024-04-18 PROCEDURE — 74177 CT ABD & PELVIS W/CONTRAST: CPT

## 2024-04-18 PROCEDURE — 700117 HCHG RX CONTRAST REV CODE 255

## 2024-04-18 RX ADMIN — IOHEXOL 100 ML: 350 INJECTION, SOLUTION INTRAVENOUS at 15:30

## 2024-04-18 RX ADMIN — IOHEXOL 25 ML: 240 INJECTION, SOLUTION INTRATHECAL; INTRAVASCULAR; INTRAVENOUS; ORAL at 14:00

## 2024-08-01 ENCOUNTER — TELEPHONE (OUTPATIENT)
Dept: HEALTH INFORMATION MANAGEMENT | Facility: OTHER | Age: 64
End: 2024-08-01

## 2024-08-26 ASSESSMENT — PATIENT HEALTH QUESTIONNAIRE - PHQ9
1. LITTLE INTEREST OR PLEASURE IN DOING THINGS: NOT AT ALL
CLINICAL INTERPRETATION OF PHQ2 SCORE: 0
2. FEELING DOWN, DEPRESSED, IRRITABLE, OR HOPELESS: NOT AT ALL

## 2024-08-26 ASSESSMENT — ACTIVITIES OF DAILY LIVING (ADL): BATHING_REQUIRES_ASSISTANCE: 0

## 2024-08-26 ASSESSMENT — ENCOUNTER SYMPTOMS: GENERAL WELL-BEING: GOOD

## 2024-08-27 ENCOUNTER — OFFICE VISIT (OUTPATIENT)
Dept: FAMILY PLANNING/WOMEN'S HEALTH CLINIC | Facility: PHYSICIAN GROUP | Age: 64
End: 2024-08-27
Payer: MEDICARE

## 2024-08-27 VITALS
BODY MASS INDEX: 29.26 KG/M2 | DIASTOLIC BLOOD PRESSURE: 82 MMHG | HEIGHT: 62 IN | SYSTOLIC BLOOD PRESSURE: 120 MMHG | WEIGHT: 159 LBS

## 2024-08-27 DIAGNOSIS — Z78.0 ENCOUNTER FOR OSTEOPOROSIS SCREENING IN ASYMPTOMATIC POSTMENOPAUSAL PATIENT: ICD-10-CM

## 2024-08-27 DIAGNOSIS — E55.9 VITAMIN D DEFICIENCY: ICD-10-CM

## 2024-08-27 DIAGNOSIS — E78.5 DYSLIPIDEMIA: ICD-10-CM

## 2024-08-27 DIAGNOSIS — I10 ESSENTIAL HYPERTENSION: ICD-10-CM

## 2024-08-27 DIAGNOSIS — I70.0 ATHEROSCLEROSIS OF ABDOMINAL AORTA (HCC): ICD-10-CM

## 2024-08-27 DIAGNOSIS — F17.200 TOBACCO DEPENDENCE SYNDROME: ICD-10-CM

## 2024-08-27 DIAGNOSIS — Z13.820 ENCOUNTER FOR OSTEOPOROSIS SCREENING IN ASYMPTOMATIC POSTMENOPAUSAL PATIENT: ICD-10-CM

## 2024-08-27 DIAGNOSIS — Z12.31 ENCOUNTER FOR SCREENING MAMMOGRAM FOR MALIGNANT NEOPLASM OF BREAST: ICD-10-CM

## 2024-08-27 DIAGNOSIS — Z79.890 POSTMENOPAUSAL HRT (HORMONE REPLACEMENT THERAPY): ICD-10-CM

## 2024-08-27 PROCEDURE — 3074F SYST BP LT 130 MM HG: CPT

## 2024-08-27 PROCEDURE — 1126F AMNT PAIN NOTED NONE PRSNT: CPT

## 2024-08-27 PROCEDURE — 3079F DIAST BP 80-89 MM HG: CPT

## 2024-08-27 PROCEDURE — 99214 OFFICE O/P EST MOD 30 MIN: CPT

## 2024-08-27 SDOH — ECONOMIC STABILITY: FOOD INSECURITY: WITHIN THE PAST 12 MONTHS, THE FOOD YOU BOUGHT JUST DIDN'T LAST AND YOU DIDN'T HAVE MONEY TO GET MORE.: NEVER TRUE

## 2024-08-27 SDOH — ECONOMIC STABILITY: INCOME INSECURITY: IN THE LAST 12 MONTHS, WAS THERE A TIME WHEN YOU WERE NOT ABLE TO PAY THE MORTGAGE OR RENT ON TIME?: NO

## 2024-08-27 SDOH — ECONOMIC STABILITY: HOUSING INSECURITY: AT ANY TIME IN THE PAST 12 MONTHS, WERE YOU HOMELESS OR LIVING IN A SHELTER (INCLUDING NOW)?: NO

## 2024-08-27 SDOH — ECONOMIC STABILITY: TRANSPORTATION INSECURITY
IN THE PAST 12 MONTHS, HAS THE LACK OF TRANSPORTATION KEPT YOU FROM MEDICAL APPOINTMENTS OR FROM GETTING MEDICATIONS?: NO

## 2024-08-27 SDOH — ECONOMIC STABILITY: FOOD INSECURITY: WITHIN THE PAST 12 MONTHS, YOU WORRIED THAT YOUR FOOD WOULD RUN OUT BEFORE YOU GOT MONEY TO BUY MORE.: NEVER TRUE

## 2024-08-27 SDOH — ECONOMIC STABILITY: INCOME INSECURITY: HOW HARD IS IT FOR YOU TO PAY FOR THE VERY BASICS LIKE FOOD, HOUSING, MEDICAL CARE, AND HEATING?: NOT HARD AT ALL

## 2024-08-27 SDOH — ECONOMIC STABILITY: TRANSPORTATION INSECURITY
IN THE PAST 12 MONTHS, HAS LACK OF TRANSPORTATION KEPT YOU FROM MEETINGS, WORK, OR FROM GETTING THINGS NEEDED FOR DAILY LIVING?: NO

## 2024-08-27 SDOH — ECONOMIC STABILITY: HOUSING INSECURITY: IN THE PAST 12 MONTHS, HOW MANY TIMES HAVE YOU MOVED WHERE YOU WERE LIVING?: 1

## 2024-08-27 ASSESSMENT — FIBROSIS 4 INDEX: FIB4 SCORE: 1.5

## 2024-08-27 ASSESSMENT — PAIN SCALES - GENERAL: PAINLEVEL: NO PAIN

## 2024-08-27 NOTE — ASSESSMENT & PLAN NOTE
Chronic, stable. The patient reports that she smokes about 5 cigarettes per day. She is not ready to quit at this time.  Follow-up at least annually.

## 2024-08-27 NOTE — ASSESSMENT & PLAN NOTE
Chronic, stable. No current treatment. Discussed heart healthy diet and regular exercise routine.  Follow-up at least annually.

## 2024-08-27 NOTE — ASSESSMENT & PLAN NOTE
Chronic, stable. The patient's blood pressure is well controlled with current medication. Continue with current defined treatment plan: benazepril-hydrochlorthiazide (LOTENSIN HCT) 20-12.5 MG per tablet. Follow-up at least annually.

## 2024-08-27 NOTE — PROGRESS NOTES
Comprehensive Health Assessment Program     Elizabeth M Schwab is a 64 y.o. here for her comprehensive health assessment.    Patient Active Problem List    Diagnosis Date Noted    Atherosclerosis of abdominal aorta (HCC) 08/27/2024    Encounter for screening mammogram for malignant neoplasm of breast 08/27/2024    Encounter for osteoporosis screening in asymptomatic postmenopausal patient 08/27/2024    EBV infection 05/17/2016    Sleep apnea 05/17/2016    Overweight 05/17/2016    Dyslipidemia 05/17/2016    Vitamin D deficiency 05/17/2016    Tobacco abuse 05/17/2016    Postmenopausal status 05/17/2016    Tobacco dependence syndrome 02/18/2016    Essential hypertension 10/09/2015    Postmenopausal HRT (hormone replacement therapy) 09/09/2014       Current Outpatient Medications   Medication Sig Dispense Refill    VITAMIN D PO Take  by mouth.      mometasone (NASONEX) 50 MCG/ACT nasal spray SPRAY 2 SPRAYS INTO EACH NOSTRIL EVERY DAY      benazepril-hydrochlorthiazide (LOTENSIN HCT) 20-12.5 MG per tablet TAKE 1 TAB BY MOUTH EVERY DAY. 30 Tab 0    estradiol (ESTRACE) 1 MG Tab TAKE 1 TAB BY MOUTH EVERY DAY. 30 Tab 0     No current facility-administered medications for this visit.          Current supplements as per medication list.     Allergies:   Nitrofurantoin, Penicillins, and Sulfa drugs  Social History     Tobacco Use    Smoking status: Every Day     Current packs/day: 0.50     Types: Cigarettes    Smokeless tobacco: Never    Tobacco comments:     1-2 day, intermittent use   Vaping Use    Vaping status: Never Used   Substance Use Topics    Alcohol use: No    Drug use: No     Family History   Problem Relation Age of Onset    Dementia Mother     Arthritis Mother     Heart Attack Father     Heart Disease Father      Isabelle  has a past medical history of CAP (community acquired pneumonia), EBV infection (5/17/2016), Hypertension, Kidney disease, Postmenopausal HRT (hormone replacement therapy), Shingles, and  Vitamin D deficiency (5/17/2016).   Past Surgical History:   Procedure Laterality Date    ABDOMINAL HYSTERECTOMY TOTAL         Screening:  In the last six months have you experienced any leakage of urine? Yes (with coughing)    Depression Screening  Little interest or pleasure in doing things?  0 - not at all  Feeling down, depressed , or hopeless? 0 - not at all  Patient Health Questionnaire Score: 0     If depressive symptoms identified deferred to follow up visit unless specifically addressed in assessment and plan.    Interpretation of PHQ-9 Total Score   Score Severity   1-4 No Depression   5-9 Mild Depression   10-14 Moderate Depression   15-19 Moderately Severe Depression   20-27 Severe Depression    Screening for Cognitive Impairment  Do you or any of your friends or family members have any concern about your memory? No  Three Minute Recall (Leader, Season, Table) 2/3    Kwasi clock face with all 12 numbers and set the hands to show 10 minutes after 11.  No Kwasi clock with all numbers, unable to set time   Cognitive concerns identified deferred for follow up unless specifically addressed in assessment and plan.    Fall Risk Assessment  Has the patient had two or more falls in the last year or any fall with injury in the last year?  No    Safety Assessment  Do you always wear your seatbelt?  Yes  Any changes to home needed to function safely? No  Difficulty hearing.  No  Patient counseled about all safety risks that were identified.    Functional Assessment ADLs  Are there any barriers preventing you from cooking for yourself or meeting nutritional needs?  No.    Are there any barriers preventing you from driving safely or obtaining transportation?  No.    Are there any barriers preventing you from using a telephone or calling for help?  No    Are there any barriers preventing you from shopping?  No.    Are there any barriers preventing you from taking care of your own finances?  No    Are there any barriers  preventing you from managing your medications?  No    Are there any barriers preventing you from showering, bathing or dressing yourself? No    Are there any barriers preventing you from doing housework or laundry? No  Are there any barriers preventing you from using the toilet?No  Are you currently engaging in any exercise or physical activity?  No.      Self-Assessment of Health  What is your perception of your health? Good    Do you sleep more than six hours a night? Yes    In the past 7 days, how much did pain keep you from doing your normal work? Some    Do you spend quality time with family or friends (virtually or in person)? Yes    Do you usually eat a heart healthy diet that constists of a variety of fruits, vegetables, whole grains and fiber? Yes    Do you eat foods high in fat and/or Fast Food more than three times per week? No    How concerned are you that your medical conditions are not being well managed? a little    Are you worried that in the next 2 months, you may not have stable housing that you own, rent, or stay in as part of a household? No      Advance Care Planning  Do you have an Advance Directive, Living Will, Durable Power of , or POLST? No                 Health Maintenance Summary            Overdue - HIV Screening (Once) Never done      No completion history exists for this topic.              Overdue - Hepatitis C Screening (Once) Never done      No completion history exists for this topic.              Overdue - Pneumococcal Vaccine: 65+ Years (1 of 2 - PCV) Never done      No completion history exists for this topic.              Overdue - Hepatitis A Vaccine (Hep A) (1 of 2 - Risk 2-dose series) Never done      No completion history exists for this topic.              Overdue - IMM DTaP/Tdap/Td Vaccine (1 - Tdap) Never done      No completion history exists for this topic.              Ordered - Mammogram (Every 2 Years) Ordered on 8/27/2024 11/28/2007  MA-DIAGNOSTIC  DIGITAL MAMMO-UNILAT    10/12/2007  MA-CAD SCREENING-MAMMO    10/12/2007  MA-SCREENING DIGITAL MAMMO              Ordered - Bone Density Scan (Every 5 Years) Ordered on 8/27/2024 11/03/2006  DS-BONE DENSITY STUDY (DEXA)              Overdue - Colorectal Cancer Screening (Colon Cancer Screening Cologuard Stool (FIT DNA) - Every 3 Years) Overdue since 5/19/2022 05/19/2019  COLOGUARD COLON CANCER SCREENING    09/09/2010  Colonoscopy (Previously completed)              Overdue - COVID-19 Vaccine (4 - 2023-24 season) Overdue since 9/1/2023 01/21/2022  Imm Admin: PFIZER PURPLE CAP SARS-COV-2 VACCINATION (12+)    06/06/2021  Imm Admin: PFIZER PURPLE CAP SARS-COV-2 VACCINATION (12+)    05/14/2021  Imm Admin: PFIZER PURPLE CAP SARS-COV-2 VACCINATION (12+)              Influenza Vaccine (1) Next due on 9/1/2024      10/30/2023  Outside Immunization: Flu MDCK Quad P-Free Inj    10/15/2022  Outside Immunization: Flu MDCK Quad P-Free Inj    10/04/2022  Outside Immunization: Influenza Quad Inj P    10/30/2021  Imm Admin: Influenza Vaccine Quad Inj (Pf)    09/30/2017  Imm Admin: Influenza Vaccine Quad Inj (Preserved)    Only the first 5 history entries have been loaded, but more history exists.              Zoster (Shingles) Vaccines (Series Information) Completed      10/05/2022  Outside Immunization: Zoster Jacob (Shingrix)    07/13/2022  Imm Admin: Zoster Vaccine Recombinant (RZV) (SHINGRIX)              Hepatitis B Vaccine (Hep B) (Series Information) Aged Out      No completion history exists for this topic.              HPV Vaccines (Series Information) Aged Out      No completion history exists for this topic.              Polio Vaccine (Inactivated Polio) (Series Information) Aged Out      No completion history exists for this topic.              Meningococcal Immunization (Series Information) Aged Out      No completion history exists for this topic.              Discontinued - Cervical Cancer Screening   "Discontinued        Frequency changed to Never automatically (Topic No Longer Applies)    09/09/2011  Previously completed                    Patient Care Team:  Jonathon Saez D.O. as PCP - General (Family Medicine)      Financial Resource Strain: Low Risk  (8/27/2024)    Overall Financial Resource Strain (CARDIA)     Difficulty of Paying Living Expenses: Not hard at all      Transportation Needs: No Transportation Needs (8/27/2024)    PRAPARE - Transportation     Lack of Transportation (Medical): No     Lack of Transportation (Non-Medical): No      Food Insecurity: No Food Insecurity (8/27/2024)    Hunger Vital Sign     Worried About Running Out of Food in the Last Year: Never true     Ran Out of Food in the Last Year: Never true        Encounter Vitals  Blood Pressure : 120/82  O2 Delivery Device: None - Room Air  Weight: 72.1 kg (159 lb)  Height: 157.5 cm (5' 2\")  BMI (Calculated): 29.08  Pain Score: No pain  DME  O2 Delivery Device: None - Room Air     Alert, oriented in no acute distress.  Eye contact is good, speech goal directed, affect calm.    Assessment and Plan. The following treatment and monitoring plan is recommended:  Essential hypertension  Chronic, stable. The patient's blood pressure is well controlled with current medication. Continue with current defined treatment plan: benazepril-hydrochlorthiazide (LOTENSIN HCT) 20-12.5 MG per tablet. Follow-up at least annually.      Dyslipidemia  Chronic, stable. No current treatment. Discussed heart healthy diet and regular exercise routine.  Follow-up at least annually.      Vitamin D deficiency  Chronic, stable. Continue with current defined treatment plan: VITAMIN D PO. Follow-up at least annually.      Atherosclerosis of abdominal aorta (HCC)  Chronic, stable. Noted on 4/18/24 CT ABD /Pelvis Vasculature: \"Mild to moderate atherosclerotic plaquing of the abdominal aorta and iliac arteries\".   Discussed heart healthy diet and regular exercise routine.  "  Continue with blood pressure control.  Follow-up at least annually.       Postmenopausal HRT (hormone replacement therapy)  Chronic, stable. The patient reports that her symptoms are well managed with current medication. Continue with current defined treatment plan: estradiol (ESTRACE) 1 MG Tab. Follow-up at least annually.      Tobacco dependence syndrome  Chronic, stable. The patient reports that she smokes about 5 cigarettes per day. She is not ready to quit at this time.  Follow-up at least annually.      Encounter for screening mammogram for malignant neoplasm of breast  Discussed with the patient the importance of getting regular breast cancer screenings. The patient verbalized understanding and agreement.  Orders for MA-SCREENING MAMMO BILAT W/CAD  placed.       Encounter for osteoporosis screening in asymptomatic postmenopausal patient  Discussed with the patient the importance of getting regular osteoporosis screenings. The patient verbalized understanding and agreement.  Orders for DS-BONE DENSITY STUDY (DEXA) placed.      Services suggested: No services needed at this time  Health Care Screening: Age-appropriate preventive services recommended by USPTF and ACIP covered by Medicare were discussed today. Services ordered if indicated and agreed upon by the patient.  Referrals offered: Community-based lifestyle interventions to reduce health risks and promote self-management and wellness, fall prevention, nutrition, physical activity, tobacco-use cessation, weight loss, and mental health services as per orders if indicated.    Discussion today about general wellness and lifestyle habits:    Prevent falls and reduce trip hazards; Cautioned about securing or removing rugs.  Have a working fire alarm and carbon monoxide detector.  Engage in regular physical activity and social activities.    Follow-up: Return for appointment with Primary Care Provider as needed.

## 2024-08-27 NOTE — ASSESSMENT & PLAN NOTE
Chronic, stable. Continue with current defined treatment plan: VITAMIN D PO. Follow-up at least annually.

## 2024-08-27 NOTE — ASSESSMENT & PLAN NOTE
"Chronic, stable. Noted on 4/18/24 CT ABD /Pelvis Vasculature: \"Mild to moderate atherosclerotic plaquing of the abdominal aorta and iliac arteries\".   Discussed heart healthy diet and regular exercise routine.   Continue with blood pressure control.  Follow-up at least annually.     "

## 2024-08-27 NOTE — ASSESSMENT & PLAN NOTE
Discussed with the patient the importance of getting regular breast cancer screenings. The patient verbalized understanding and agreement.  Orders for MA-SCREENING MAMMO BILAT W/CAD  placed.

## 2024-08-27 NOTE — ASSESSMENT & PLAN NOTE
Chronic, stable. The patient reports that her symptoms are well managed with current medication. Continue with current defined treatment plan: estradiol (ESTRACE) 1 MG Tab. Follow-up at least annually.

## 2024-08-30 ENCOUNTER — HOSPITAL ENCOUNTER (OUTPATIENT)
Dept: LAB | Facility: MEDICAL CENTER | Age: 64
End: 2024-08-30
Attending: FAMILY MEDICINE
Payer: MEDICARE

## 2024-08-30 LAB
ALBUMIN SERPL BCP-MCNC: 4 G/DL (ref 3.2–4.9)
ALBUMIN/GLOB SERPL: 1.4 G/DL
ALP SERPL-CCNC: 83 U/L (ref 30–99)
ALT SERPL-CCNC: 9 U/L (ref 2–50)
ANION GAP SERPL CALC-SCNC: 11 MMOL/L (ref 7–16)
AST SERPL-CCNC: 16 U/L (ref 12–45)
BASOPHILS # BLD AUTO: 0.7 % (ref 0–1.8)
BASOPHILS # BLD: 0.12 K/UL (ref 0–0.12)
BILIRUB SERPL-MCNC: 0.5 MG/DL (ref 0.1–1.5)
BUN SERPL-MCNC: 22 MG/DL (ref 8–22)
CALCIUM ALBUM COR SERPL-MCNC: 9.2 MG/DL (ref 8.5–10.5)
CALCIUM SERPL-MCNC: 9.2 MG/DL (ref 8.5–10.5)
CHLORIDE SERPL-SCNC: 103 MMOL/L (ref 96–112)
CHOLEST SERPL-MCNC: 210 MG/DL (ref 100–199)
CO2 SERPL-SCNC: 26 MMOL/L (ref 20–33)
CREAT SERPL-MCNC: 0.74 MG/DL (ref 0.5–1.4)
EOSINOPHIL # BLD AUTO: 0.1 K/UL (ref 0–0.51)
EOSINOPHIL NFR BLD: 0.6 % (ref 0–6.9)
ERYTHROCYTE [DISTWIDTH] IN BLOOD BY AUTOMATED COUNT: 56 FL (ref 35.9–50)
GFR SERPLBLD CREATININE-BSD FMLA CKD-EPI: 90 ML/MIN/1.73 M 2
GLOBULIN SER CALC-MCNC: 2.8 G/DL (ref 1.9–3.5)
GLUCOSE SERPL-MCNC: 96 MG/DL (ref 65–99)
HCT VFR BLD AUTO: 48 % (ref 37–47)
HDLC SERPL-MCNC: 44 MG/DL
HGB BLD-MCNC: 15.8 G/DL (ref 12–16)
IMM GRANULOCYTES # BLD AUTO: 0.1 K/UL (ref 0–0.11)
IMM GRANULOCYTES NFR BLD AUTO: 0.6 % (ref 0–0.9)
LDLC SERPL CALC-MCNC: 138 MG/DL
LYMPHOCYTES # BLD AUTO: 3.41 K/UL (ref 1–4.8)
LYMPHOCYTES NFR BLD: 19 % (ref 22–41)
MCH RBC QN AUTO: 31.6 PG (ref 27–33)
MCHC RBC AUTO-ENTMCNC: 32.9 G/DL (ref 32.2–35.5)
MCV RBC AUTO: 96 FL (ref 81.4–97.8)
MONOCYTES # BLD AUTO: 0.97 K/UL (ref 0–0.85)
MONOCYTES NFR BLD AUTO: 5.4 % (ref 0–13.4)
NEUTROPHILS # BLD AUTO: 13.21 K/UL (ref 1.82–7.42)
NEUTROPHILS NFR BLD: 73.7 % (ref 44–72)
NRBC # BLD AUTO: 0 K/UL
NRBC BLD-RTO: 0 /100 WBC (ref 0–0.2)
PLATELET # BLD AUTO: 259 K/UL (ref 164–446)
PMV BLD AUTO: 11.4 FL (ref 9–12.9)
POTASSIUM SERPL-SCNC: 3.9 MMOL/L (ref 3.6–5.5)
PROT SERPL-MCNC: 6.8 G/DL (ref 6–8.2)
RBC # BLD AUTO: 5 M/UL (ref 4.2–5.4)
SODIUM SERPL-SCNC: 140 MMOL/L (ref 135–145)
TRIGL SERPL-MCNC: 141 MG/DL (ref 0–149)
TSH SERPL-ACNC: 1.45 UIU/ML (ref 0.35–5.5)
WBC # BLD AUTO: 17.9 K/UL (ref 4.8–10.8)

## 2024-08-30 PROCEDURE — 36415 COLL VENOUS BLD VENIPUNCTURE: CPT

## 2024-08-30 PROCEDURE — 80053 COMPREHEN METABOLIC PANEL: CPT

## 2024-08-30 PROCEDURE — 80061 LIPID PANEL: CPT

## 2024-08-30 PROCEDURE — 85025 COMPLETE CBC W/AUTO DIFF WBC: CPT

## 2024-08-30 PROCEDURE — 84443 ASSAY THYROID STIM HORMONE: CPT

## 2024-09-23 ENCOUNTER — HOSPITAL ENCOUNTER (OUTPATIENT)
Dept: RADIOLOGY | Facility: MEDICAL CENTER | Age: 64
End: 2024-09-23
Payer: MEDICARE

## 2024-09-23 DIAGNOSIS — Z12.31 ENCOUNTER FOR SCREENING MAMMOGRAM FOR MALIGNANT NEOPLASM OF BREAST: ICD-10-CM

## 2024-09-23 DIAGNOSIS — Z78.0 ENCOUNTER FOR OSTEOPOROSIS SCREENING IN ASYMPTOMATIC POSTMENOPAUSAL PATIENT: ICD-10-CM

## 2024-09-23 DIAGNOSIS — Z13.820 ENCOUNTER FOR OSTEOPOROSIS SCREENING IN ASYMPTOMATIC POSTMENOPAUSAL PATIENT: ICD-10-CM

## 2024-09-23 PROCEDURE — 77067 SCR MAMMO BI INCL CAD: CPT

## 2024-09-23 PROCEDURE — 77080 DXA BONE DENSITY AXIAL: CPT

## 2024-09-25 ENCOUNTER — HOSPITAL ENCOUNTER (OUTPATIENT)
Dept: RADIOLOGY | Facility: MEDICAL CENTER | Age: 64
End: 2024-09-25

## 2025-01-29 ENCOUNTER — HOSPITAL ENCOUNTER (OUTPATIENT)
Dept: LAB | Facility: MEDICAL CENTER | Age: 65
End: 2025-01-29
Attending: FAMILY MEDICINE
Payer: MEDICARE

## 2025-01-29 LAB
ALBUMIN SERPL BCP-MCNC: 4.1 G/DL (ref 3.2–4.9)
ALBUMIN/GLOB SERPL: 1.4 G/DL
ALP SERPL-CCNC: 95 U/L (ref 30–99)
ALT SERPL-CCNC: 17 U/L (ref 2–50)
ANION GAP SERPL CALC-SCNC: 14 MMOL/L (ref 7–16)
AST SERPL-CCNC: 23 U/L (ref 12–45)
BASOPHILS # BLD AUTO: 1.1 % (ref 0–1.8)
BASOPHILS # BLD: 0.16 K/UL (ref 0–0.12)
BILIRUB SERPL-MCNC: 0.2 MG/DL (ref 0.1–1.5)
BUN SERPL-MCNC: 12 MG/DL (ref 8–22)
CALCIUM ALBUM COR SERPL-MCNC: 8.9 MG/DL (ref 8.5–10.5)
CALCIUM SERPL-MCNC: 9 MG/DL (ref 8.5–10.5)
CHLORIDE SERPL-SCNC: 100 MMOL/L (ref 96–112)
CO2 SERPL-SCNC: 25 MMOL/L (ref 20–33)
CREAT SERPL-MCNC: 0.79 MG/DL (ref 0.5–1.4)
EOSINOPHIL # BLD AUTO: 0.27 K/UL (ref 0–0.51)
EOSINOPHIL NFR BLD: 1.8 % (ref 0–6.9)
ERYTHROCYTE [DISTWIDTH] IN BLOOD BY AUTOMATED COUNT: 55.8 FL (ref 35.9–50)
GFR SERPLBLD CREATININE-BSD FMLA CKD-EPI: 83 ML/MIN/1.73 M 2
GLOBULIN SER CALC-MCNC: 3 G/DL (ref 1.9–3.5)
GLUCOSE SERPL-MCNC: 85 MG/DL (ref 65–99)
HCT VFR BLD AUTO: 52.4 % (ref 37–47)
HGB BLD-MCNC: 17 G/DL (ref 12–16)
IMM GRANULOCYTES # BLD AUTO: 0.07 K/UL (ref 0–0.11)
IMM GRANULOCYTES NFR BLD AUTO: 0.5 % (ref 0–0.9)
LYMPHOCYTES # BLD AUTO: 3.57 K/UL (ref 1–4.8)
LYMPHOCYTES NFR BLD: 23.8 % (ref 22–41)
MCH RBC QN AUTO: 30.9 PG (ref 27–33)
MCHC RBC AUTO-ENTMCNC: 32.4 G/DL (ref 32.2–35.5)
MCV RBC AUTO: 95.3 FL (ref 81.4–97.8)
MONOCYTES # BLD AUTO: 0.96 K/UL (ref 0–0.85)
MONOCYTES NFR BLD AUTO: 6.4 % (ref 0–13.4)
NEUTROPHILS # BLD AUTO: 9.95 K/UL (ref 1.82–7.42)
NEUTROPHILS NFR BLD: 66.4 % (ref 44–72)
NRBC # BLD AUTO: 0 K/UL
NRBC BLD-RTO: 0 /100 WBC (ref 0–0.2)
PLATELET # BLD AUTO: 273 K/UL (ref 164–446)
PMV BLD AUTO: 11.6 FL (ref 9–12.9)
POTASSIUM SERPL-SCNC: 4.1 MMOL/L (ref 3.6–5.5)
PROT SERPL-MCNC: 7.1 G/DL (ref 6–8.2)
RBC # BLD AUTO: 5.5 M/UL (ref 4.2–5.4)
SODIUM SERPL-SCNC: 139 MMOL/L (ref 135–145)
T4 FREE SERPL-MCNC: 1.43 NG/DL (ref 0.93–1.7)
TSH SERPL-ACNC: 1.11 UIU/ML (ref 0.35–5.5)
WBC # BLD AUTO: 15 K/UL (ref 4.8–10.8)

## 2025-01-29 PROCEDURE — 85025 COMPLETE CBC W/AUTO DIFF WBC: CPT

## 2025-01-29 PROCEDURE — 84443 ASSAY THYROID STIM HORMONE: CPT

## 2025-01-29 PROCEDURE — 84439 ASSAY OF FREE THYROXINE: CPT

## 2025-01-29 PROCEDURE — 80053 COMPREHEN METABOLIC PANEL: CPT

## 2025-01-29 PROCEDURE — 36415 COLL VENOUS BLD VENIPUNCTURE: CPT

## 2025-04-17 ENCOUNTER — OFFICE VISIT (OUTPATIENT)
Dept: URGENT CARE | Facility: PHYSICIAN GROUP | Age: 65
End: 2025-04-17
Payer: MEDICARE

## 2025-04-17 VITALS
DIASTOLIC BLOOD PRESSURE: 62 MMHG | SYSTOLIC BLOOD PRESSURE: 116 MMHG | TEMPERATURE: 97.9 F | OXYGEN SATURATION: 97 % | BODY MASS INDEX: 24.48 KG/M2 | RESPIRATION RATE: 14 BRPM | WEIGHT: 133 LBS | HEART RATE: 87 BPM | HEIGHT: 62 IN

## 2025-04-17 DIAGNOSIS — R05.1 ACUTE COUGH: ICD-10-CM

## 2025-04-17 DIAGNOSIS — B96.89 BACTERIAL SINUSITIS: ICD-10-CM

## 2025-04-17 DIAGNOSIS — J32.9 BACTERIAL SINUSITIS: ICD-10-CM

## 2025-04-17 PROCEDURE — 3074F SYST BP LT 130 MM HG: CPT

## 2025-04-17 PROCEDURE — 99213 OFFICE O/P EST LOW 20 MIN: CPT

## 2025-04-17 PROCEDURE — 0241U POCT CEPHEID COV-2, FLU A/B, RSV - PCR: CPT

## 2025-04-17 PROCEDURE — 3078F DIAST BP <80 MM HG: CPT

## 2025-04-17 RX ORDER — DOXYCYCLINE HYCLATE 100 MG
100 TABLET ORAL 2 TIMES DAILY
Qty: 14 TABLET | Refills: 0 | Status: SHIPPED | OUTPATIENT
Start: 2025-04-17 | End: 2025-04-24

## 2025-04-17 RX ORDER — METRONIDAZOLE 250 MG/1
TABLET ORAL
COMMUNITY
Start: 2025-01-24 | End: 2025-04-17

## 2025-04-17 RX ORDER — BENZONATATE 100 MG/1
100 CAPSULE ORAL 3 TIMES DAILY PRN
Qty: 60 CAPSULE | Refills: 0 | Status: SHIPPED | OUTPATIENT
Start: 2025-04-17

## 2025-04-17 RX ORDER — ONDANSETRON 4 MG/1
TABLET, ORALLY DISINTEGRATING ORAL
COMMUNITY
Start: 2025-01-29

## 2025-04-17 ASSESSMENT — FIBROSIS 4 INDEX: FIB4 SCORE: 1.33

## 2025-04-17 ASSESSMENT — ENCOUNTER SYMPTOMS
SPUTUM PRODUCTION: 1
SINUS PAIN: 1
HEADACHES: 1
COUGH: 1
FEVER: 0
CHILLS: 1

## 2025-04-17 NOTE — PROGRESS NOTES
Subjective:     CHIEF COMPLAINT  Chief Complaint   Patient presents with    URI     Sinus infection, congested x over a week        HPI  Elizabeth Madeline Schwab is a very pleasant 64 y.o. female who presents with nasal congestion with green/brown mucus, a cough productive of thick phlegm, fatigue, chills, difficulty sleeping due to her cough, and maxillary facial pain.  Her symptoms have been present for approximately 1 week.  She reports similar symptoms in the past when she had COVID and is wondering if perhaps she is COVID at this time as well.      REVIEW OF SYSTEMS  Review of Systems   Constitutional:  Positive for chills and malaise/fatigue. Negative for fever.   HENT:  Positive for congestion and sinus pain.    Respiratory:  Positive for cough and sputum production.    Neurological:  Positive for headaches.       PAST MEDICAL HISTORY  Patient Active Problem List    Diagnosis Date Noted    Atherosclerosis of abdominal aorta (HCC) 08/27/2024    Encounter for screening mammogram for malignant neoplasm of breast 08/27/2024    Encounter for osteoporosis screening in asymptomatic postmenopausal patient 08/27/2024    EBV infection 05/17/2016    Sleep apnea 05/17/2016    Overweight 05/17/2016    Dyslipidemia 05/17/2016    Vitamin D deficiency 05/17/2016    Tobacco abuse 05/17/2016    Postmenopausal status 05/17/2016    Tobacco dependence syndrome 02/18/2016    Essential hypertension 10/09/2015    Postmenopausal HRT (hormone replacement therapy) 09/09/2014       SURGICAL HISTORY   has a past surgical history that includes abdominal hysterectomy total.    ALLERGIES  Allergies   Allergen Reactions    Clindamycin Nausea    Nitrofurantoin     Penicillins     Sulfa Drugs        CURRENT MEDICATIONS  Home Medications       Reviewed by Keara Dumas P.A.-C. (Physician Assistant) on 04/17/25 at 1035  Med List Status: <None>     Medication Last Dose Status   benazepril-hydrochlorthiazide (LOTENSIN HCT) 20-12.5 MG per  "tablet Taking Active   estradiol (ESTRACE) 1 MG Tab Taking Active   metroNIDAZOLE (FLAGYL) 250 MG Tab Not Taking Active   mometasone (NASONEX) 50 MCG/ACT nasal spray Not Taking Active   ondansetron (ZOFRAN ODT) 4 MG TABLET DISPERSIBLE Not Taking Active   riFAXIMin (XIFAXAN) 550 MG Tab tablet Not Taking Active   VITAMIN D PO Not Taking Active                    SOCIAL HISTORY  Social History     Tobacco Use    Smoking status: Every Day     Current packs/day: 0.50     Types: Cigarettes    Smokeless tobacco: Never    Tobacco comments:     1-2 day, intermittent use   Vaping Use    Vaping status: Never Used   Substance and Sexual Activity    Alcohol use: No    Drug use: No    Sexual activity: Yes     Partners: Male       FAMILY HISTORY  Family History   Problem Relation Age of Onset    Dementia Mother     Arthritis Mother     Heart Attack Father     Heart Disease Father           Objective:     VITAL SIGNS: /62 (BP Location: Right arm, Patient Position: Sitting, BP Cuff Size: Adult)   Pulse 87   Temp 36.6 °C (97.9 °F) (Temporal)   Resp 14   Ht 1.575 m (5' 2\")   Wt 60.3 kg (133 lb)   SpO2 97%   BMI 24.33 kg/m²     PHYSICAL EXAM  Physical Exam  Vitals reviewed.   Constitutional:       General: She is not in acute distress.     Appearance: Normal appearance. She is ill-appearing. She is not toxic-appearing.   HENT:      Head: Normocephalic and atraumatic.      Right Ear: Tympanic membrane, ear canal and external ear normal.      Left Ear: Tympanic membrane, ear canal and external ear normal.      Nose: Congestion present.      Mouth/Throat:      Mouth: Mucous membranes are moist.      Pharynx: Uvula midline. Pharyngeal swelling and posterior oropharyngeal erythema present. No oropharyngeal exudate.   Eyes:      Conjunctiva/sclera: Conjunctivae normal.      Pupils: Pupils are equal, round, and reactive to light.   Cardiovascular:      Rate and Rhythm: Normal rate and regular rhythm.      Heart sounds: Normal " heart sounds.   Pulmonary:      Effort: Pulmonary effort is normal. No respiratory distress.      Breath sounds: Normal breath sounds. No stridor. No wheezing, rhonchi or rales.   Skin:     General: Skin is warm and dry.      Coloration: Skin is not pale.   Neurological:      General: No focal deficit present.      Mental Status: She is alert and oriented to person, place, and time.   Psychiatric:         Mood and Affect: Mood normal.         Assessment/Plan:     1. Acute cough  - POCT CoV-2, Flu A/B, RSV by PCR  - benzonatate (TESSALON) 100 MG Cap; Take 1 Capsule by mouth 3 times a day as needed for Cough.  Dispense: 60 Capsule; Refill: 0    2. Bacterial sinusitis  - doxycycline (VIBRAMYCIN) 100 MG Tab; Take 1 Tablet by mouth 2 times a day for 7 days.  Dispense: 14 Tablet; Refill: 0    Other orders  - ondansetron (ZOFRAN ODT) 4 MG TABLET DISPERSIBLE; DISSOLVE 1 TABLET BY MOUTH 3 TIMES A DAY AS NEEDED (Patient not taking: Reported on 4/17/2025)  -Sinus flushes using distilled water and saline  -Tylenol OTC as needed for discomfort  -Rest and hydrate  -Return to clinic if symptoms worsen or fail to resolve      MDM/Comments:  Patient has stable vital signs and is non-toxic appearing.  Viral testing for COVID, influenza, and RSV performed in office with negative results.  Patient has been called informed of results.  Discussed supportive care with hydration, rest, Tylenol as needed.   Discussed use of saline nasal flushes using distilled water.  Discussed that sinus infections are typically viral, yet given duration of symptoms I suspect a bacterial origin of symptoms in this case.  Antibiotic treatment started with Doxycycline.  Patient demonstrated understanding of treatment plan at this time and will RTC if symptoms worsen or fail to resolve.     Differential diagnosis, natural history, supportive care, and indications for immediate follow-up discussed. All questions answered. Patient agrees with the plan of  care.    Follow-up as needed if symptoms worsen or fail to improve to PCP, Urgent care or Emergency Room.    I have personally reviewed prior external notes and test results pertinent to today's visit.  I have independently reviewed and interpreted all diagnostics ordered during this urgent care acute visit.   Discussed management options (risks,benefits, and alternatives to treatment). Pt expresses understanding and the treatment plan was agreed upon. Questions were encouraged and answered to pt's satisfaction.    Please note that this dictation was created using voice recognition software. I have made a reasonable attempt to correct obvious errors, but I expect that there are errors of grammar and possibly content that I did not discover before finalizing the note.

## 2025-06-10 SDOH — ECONOMIC STABILITY: FOOD INSECURITY: WITHIN THE PAST 12 MONTHS, YOU WORRIED THAT YOUR FOOD WOULD RUN OUT BEFORE YOU GOT MONEY TO BUY MORE.: NEVER TRUE

## 2025-06-10 SDOH — ECONOMIC STABILITY: INCOME INSECURITY: IN THE LAST 12 MONTHS, WAS THERE A TIME WHEN YOU WERE NOT ABLE TO PAY THE MORTGAGE OR RENT ON TIME?: NO

## 2025-06-10 SDOH — ECONOMIC STABILITY: FOOD INSECURITY: WITHIN THE PAST 12 MONTHS, THE FOOD YOU BOUGHT JUST DIDN'T LAST AND YOU DIDN'T HAVE MONEY TO GET MORE.: NEVER TRUE

## 2025-06-10 SDOH — ECONOMIC STABILITY: INCOME INSECURITY: HOW HARD IS IT FOR YOU TO PAY FOR THE VERY BASICS LIKE FOOD, HOUSING, MEDICAL CARE, AND HEATING?: NOT VERY HARD

## 2025-06-10 SDOH — ECONOMIC STABILITY: TRANSPORTATION INSECURITY
IN THE PAST 12 MONTHS, HAS LACK OF RELIABLE TRANSPORTATION KEPT YOU FROM MEDICAL APPOINTMENTS, MEETINGS, WORK OR FROM GETTING THINGS NEEDED FOR DAILY LIVING?: NO

## 2025-06-10 SDOH — HEALTH STABILITY: PHYSICAL HEALTH: ON AVERAGE, HOW MANY MINUTES DO YOU ENGAGE IN EXERCISE AT THIS LEVEL?: 0 MIN

## 2025-06-10 SDOH — HEALTH STABILITY: PHYSICAL HEALTH: ON AVERAGE, HOW MANY DAYS PER WEEK DO YOU ENGAGE IN MODERATE TO STRENUOUS EXERCISE (LIKE A BRISK WALK)?: 0 DAYS

## 2025-06-10 SDOH — ECONOMIC STABILITY: HOUSING INSECURITY
IN THE LAST 12 MONTHS, WAS THERE A TIME WHEN YOU DID NOT HAVE A STEADY PLACE TO SLEEP OR SLEPT IN A SHELTER (INCLUDING NOW)?: NO

## 2025-06-10 SDOH — HEALTH STABILITY: MENTAL HEALTH
STRESS IS WHEN SOMEONE FEELS TENSE, NERVOUS, ANXIOUS, OR CAN'T SLEEP AT NIGHT BECAUSE THEIR MIND IS TROUBLED. HOW STRESSED ARE YOU?: NOT AT ALL

## 2025-06-10 ASSESSMENT — SOCIAL DETERMINANTS OF HEALTH (SDOH)
DO YOU BELONG TO ANY CLUBS OR ORGANIZATIONS SUCH AS CHURCH GROUPS UNIONS, FRATERNAL OR ATHLETIC GROUPS, OR SCHOOL GROUPS?: NO
HOW OFTEN DO YOU GET TOGETHER WITH FRIENDS OR RELATIVES?: TWICE A WEEK
HOW OFTEN DO YOU HAVE A DRINK CONTAINING ALCOHOL: NEVER
HOW OFTEN DO YOU ATTENT MEETINGS OF THE CLUB OR ORGANIZATION YOU BELONG TO?: NEVER
HOW OFTEN DO YOU HAVE SIX OR MORE DRINKS ON ONE OCCASION: NEVER
HOW OFTEN DO YOU ATTEND CHURCH OR RELIGIOUS SERVICES?: NEVER
HOW OFTEN DO YOU ATTENT MEETINGS OF THE CLUB OR ORGANIZATION YOU BELONG TO?: NEVER
HOW HARD IS IT FOR YOU TO PAY FOR THE VERY BASICS LIKE FOOD, HOUSING, MEDICAL CARE, AND HEATING?: NOT VERY HARD
WITHIN THE PAST 12 MONTHS, YOU WORRIED THAT YOUR FOOD WOULD RUN OUT BEFORE YOU GOT THE MONEY TO BUY MORE: NEVER TRUE
IN A TYPICAL WEEK, HOW MANY TIMES DO YOU TALK ON THE PHONE WITH FAMILY, FRIENDS, OR NEIGHBORS?: MORE THAN THREE TIMES A WEEK
HOW OFTEN DO YOU GET TOGETHER WITH FRIENDS OR RELATIVES?: TWICE A WEEK
IN THE PAST 12 MONTHS, HAS THE ELECTRIC, GAS, OIL, OR WATER COMPANY THREATENED TO SHUT OFF SERVICE IN YOUR HOME?: NO
HOW OFTEN DO YOU ATTEND CHURCH OR RELIGIOUS SERVICES?: NEVER
IN A TYPICAL WEEK, HOW MANY TIMES DO YOU TALK ON THE PHONE WITH FAMILY, FRIENDS, OR NEIGHBORS?: MORE THAN THREE TIMES A WEEK
HOW MANY DRINKS CONTAINING ALCOHOL DO YOU HAVE ON A TYPICAL DAY WHEN YOU ARE DRINKING: PATIENT DOES NOT DRINK
DO YOU BELONG TO ANY CLUBS OR ORGANIZATIONS SUCH AS CHURCH GROUPS UNIONS, FRATERNAL OR ATHLETIC GROUPS, OR SCHOOL GROUPS?: NO

## 2025-06-10 ASSESSMENT — LIFESTYLE VARIABLES
AUDIT-C TOTAL SCORE: 0
SKIP TO QUESTIONS 9-10: 1
HOW OFTEN DO YOU HAVE SIX OR MORE DRINKS ON ONE OCCASION: NEVER
HOW MANY STANDARD DRINKS CONTAINING ALCOHOL DO YOU HAVE ON A TYPICAL DAY: PATIENT DOES NOT DRINK
HOW OFTEN DO YOU HAVE A DRINK CONTAINING ALCOHOL: NEVER

## 2025-06-13 ENCOUNTER — APPOINTMENT (OUTPATIENT)
Dept: MEDICAL GROUP | Facility: MEDICAL CENTER | Age: 65
End: 2025-06-13
Payer: MEDICARE

## 2025-06-13 VITALS
OXYGEN SATURATION: 99 % | DIASTOLIC BLOOD PRESSURE: 78 MMHG | WEIGHT: 151.8 LBS | RESPIRATION RATE: 18 BRPM | SYSTOLIC BLOOD PRESSURE: 130 MMHG | BODY MASS INDEX: 27.94 KG/M2 | HEIGHT: 62 IN | TEMPERATURE: 97.6 F | HEART RATE: 70 BPM

## 2025-06-13 DIAGNOSIS — Z88.1 HISTORY OF ALLERGY TO ANTIBIOTIC AGENT: ICD-10-CM

## 2025-06-13 DIAGNOSIS — I10 ESSENTIAL HYPERTENSION: ICD-10-CM

## 2025-06-13 DIAGNOSIS — F17.210 CIGARETTE SMOKER: ICD-10-CM

## 2025-06-13 DIAGNOSIS — Z79.890 POSTMENOPAUSAL HRT (HORMONE REPLACEMENT THERAPY): ICD-10-CM

## 2025-06-13 DIAGNOSIS — Z12.31 ENCOUNTER FOR SCREENING MAMMOGRAM FOR MALIGNANT NEOPLASM OF BREAST: ICD-10-CM

## 2025-06-13 DIAGNOSIS — E55.9 VITAMIN D DEFICIENCY: ICD-10-CM

## 2025-06-13 DIAGNOSIS — E78.5 DYSLIPIDEMIA: ICD-10-CM

## 2025-06-13 DIAGNOSIS — K63.8219 SMALL INTESTINAL BACTERIAL OVERGROWTH (SIBO): ICD-10-CM

## 2025-06-13 DIAGNOSIS — M19.041 ARTHRITIS OF BOTH HANDS: Primary | ICD-10-CM

## 2025-06-13 DIAGNOSIS — M19.042 ARTHRITIS OF BOTH HANDS: Primary | ICD-10-CM

## 2025-06-13 PROCEDURE — 99214 OFFICE O/P EST MOD 30 MIN: CPT | Performed by: FAMILY MEDICINE

## 2025-06-13 PROCEDURE — 3078F DIAST BP <80 MM HG: CPT | Performed by: FAMILY MEDICINE

## 2025-06-13 PROCEDURE — 3075F SYST BP GE 130 - 139MM HG: CPT | Performed by: FAMILY MEDICINE

## 2025-06-13 ASSESSMENT — PATIENT HEALTH QUESTIONNAIRE - PHQ9: CLINICAL INTERPRETATION OF PHQ2 SCORE: 0

## 2025-06-13 ASSESSMENT — FIBROSIS 4 INDEX: FIB4 SCORE: 1.33

## 2025-06-13 NOTE — PROGRESS NOTES
Verbal consent was acquired by the patient to use [employer] ambient listening note generation during this visit    Subjective:     CC:  The primary encounter diagnosis was Arthritis of both hands. Diagnoses of Small intestinal bacterial overgrowth (SIBO), Cigarette smoker, History of allergy to antibiotic agent, Postmenopausal HRT (hormone replacement therapy), Essential hypertension, Dyslipidemia, Vitamin D deficiency, and Encounter for screening mammogram for malignant neoplasm of breast were also pertinent to this visit.    HISTORY OF THE PRESENT ILLNESS: Patient is a 64 y.o. female. This pleasant patient is here today to establish care and discuss Arthritis, SIBO, chronic conditions.     History of Present Illness  The patient presents for a new patient encounter.  Patient is accompanied by her .    Severe Arthritis in Both Hands  She is experiencing severe arthritis in both hands, with the right hand being more affected. Swelling in her right hand and limited range of motion are reported, which she attributes to potential childhood damage. 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 She received an injection at the [facility] 2 years ago. She uses a brace for support and applies Aspercreme for pain relief. Rest alleviates her symptoms, but prolonged typing triggers them. She has tried icing the area, but it exacerbates the pain, so she uses heat instead.  - Onset: Severe arthritis in both hands, with the right hand being more affected.  - Location: Both hands, with the right hand being more affected.  - Duration: Chronic, with exacerbation due to prolonged typing.  - Character: Swelling and limited range of motion in the right hand.  - Alleviating/Aggravating Factors: Rest alleviates symptoms; prolonged typing triggers them; icing exacerbates pain; heat is used instead.  - Severity: Severe, requiring brace support and pain relief  with Aspercreme.    Small Intestinal Bacterial Overgrowth (SIBO)  She has been managing Small Intestinal Bacterial Overgrowth (SIBO) for the past year under the care of a gastroenterologist at [facility]. Improvement in her condition is reported, although she still experiences some discomfort. She has a follow-up appointment scheduled for next month. She was prescribed antibiotics and has adopted a low-carb diet, which she reports as beneficial.  - Onset: Managing for the past year.  - Duration: Ongoing for the past year.  - Character: Some discomfort despite improvement.  - Alleviating Factors: Antibiotics and low-carb diet.    History of Smoking  She has a history of smoking, having reduced her consumption from a pack a day to half a pack since 2024. No new cough or weight loss is reported. She does not consume alcohol.  - Onset: Reduced consumption since 2024.  - Duration: Ongoing reduction in smoking.  - Character: No new cough or weight loss.    Allergies  She has a history of allergies to penicillin, clindamycin, nitrofurantoin, and sulfa drugs, which cause rashes and stomach upset. She has a history of H. pylori infection from 30 years ago.  - Character: Allergies cause rashes and stomach upset.    Hormone Replacement Therapy  Hormone replacement therapy has been ongoing since her hysterectomy in 2005, which she reports as effective. She occasionally uses nasal spray for allergy management and receives allergy injections twice a year.  - Onset: Since hysterectomy in 2005.  - Duration: Ongoing since 2005.  - Character: Effective.    Cholesterol Levels  Her cholesterol levels have been borderline, and dietary changes have been made to manage this.  - Character: Borderline cholesterol levels.  - Alleviating Factors: Dietary changes.    Vitamin D Levels  She has a tendency towards low vitamin D levels and was previously on a high-dose supplement for 3 months.  - Character: Tendency towards low vitamin D levels.  -  "Alleviating Factors: High-dose supplement for 3 months.    She is due for a tetanus vaccine and has received the shingles series. She had a mammogram on 2024, which showed focal asymmetry, but older films were reviewed and the size was the same. A bone density screening at that time was normal in the spine and femur. Her last Cologuard test was in 2019, and she is not due for another colonoscopy until .    PAST SURGICAL HISTORY:  - Shoulder surgery  - Hysterectomy ()    SOCIAL HISTORY  The patient admits to smoking about half a pack a day since . The patient does not drink alcohol.    FAMILY HISTORY  The patient's mother had dementia and arthritis. The patient's father had a heart attack and heart disease. No family history of cancer or diabetes.    Current Medications and Prescriptions Ordered in Epic[1]    Allergies[2]    Past Medical History[3]    Past Surgical History[4]    Family History   Problem Relation Age of Onset    Dementia Mother     Arthritis Mother     Heart Attack Father     Heart Disease Father        Social History[5]    Health Maintenance: Completed    ROS:   ROS see HPI      Objective:     Exam: /78 (BP Location: Left arm, Patient Position: Sitting, BP Cuff Size: Adult)   Pulse 70   Temp 36.4 °C (97.6 °F) (Temporal)   Resp 18   Ht 1.575 m (5' 2\")   Wt 68.9 kg (151 lb 12.8 oz)   SpO2 99%  Body mass index is 27.76 kg/m².    Physical Exam  Physical Exam  General Appearance: Normal.  Vital signs: Within normal limits.  HEENT: Within normal limits.  Respiratory: Clear to auscultation, no wheezing, rales or rhonchi.  Cardiovascular: Regular rate and rhythm, no murmurs, rubs, or gallops.  Back, Musculoskeletal: Restricted range of motion in the right hand.  Skin: Warm and dry, no rash.  Neurological: Normal.      Results  Imaging   - Mammogram: 2024, Focal asymmetry, same size as previous film.   - Bone density screenin2024, Normal in the spine and femur.   "     Assessment & Plan:   64 y.o. female with the following -    1. Arthritis of both hands  - diclofenac sodium (VOLTAREN) 1 % Gel; Apply 2 g topically 4 times a day as needed (arthritis pain in hand).  Dispense: 150 g; Refill: 3  - CBC WITH DIFFERENTIAL; Future  - Comp Metabolic Panel; Future    2. Small intestinal bacterial overgrowth (SIBO)  - CBC WITH DIFFERENTIAL; Future  - Comp Metabolic Panel; Future    3. Cigarette smoker  - REFERRAL TO LUNG CANCER SCREENING PROGRAM  - CBC WITH DIFFERENTIAL; Future    4. History of allergy to antibiotic agent    5. Postmenopausal HRT (hormone replacement therapy)  - CBC WITH DIFFERENTIAL; Future  - Comp Metabolic Panel; Future    6. Essential hypertension  - Comp Metabolic Panel; Future  - TSH WITH REFLEX TO FT4; Future    7. Dyslipidemia  - Comp Metabolic Panel; Future  - Lipid Profile; Future    8. Vitamin D deficiency  - VITAMIN D,25 HYDROXY (DEFICIENCY); Future    9. Encounter for screening mammogram for malignant neoplasm of breast  - MA-SCREENING MAMMO BILAT W/TOMOSYNTHESIS W/CAD; Future      Assessment & Plan  1. Bilateral hand arthritis: Chronic.  - Increased pain and limited mobility.  - Voltaren gel for topical application up to four times daily.  - Referral for hand therapy can be arranged upon request.    2. Small intestinal bacterial overgrowth (SIBO): Stable.  - Reports improvement with antibiotic treatment and dietary changes.  - Continued dietary modifications and monitoring of symptoms.    3. Smoking cessation.  - Reduced smoking from a pack a day to half a pack a day since 2024.  - Encouragement to quit smoking entirely.  - Referral for lung cancer screening due to smoking history and age.    4. Allergies.  - Allergies to penicillins, clindamycin, nitrofurantoin, and sulfa drugs, causing rashes and stomach upset.  - Counseling on implications for future antibiotic treatments.    5. Hormone replacement therapy.  - Effective management of symptoms since  hysterectomy in 2005.  - Continued prescription of hormone replacement therapy.    6. Health maintenance.  - Cholesterol levels slightly elevated.  - Mammogram from 09/23/2024 showed focal asymmetry, but older films were reviewed and the size was the same.  - Bone density screening normal in spine and femur.  - Last Cologuard test in 2019; next colonoscopy due in 2027.  - Fasting labs ordered to monitor cholesterol levels.  - Mammogram scheduled for fall 2025.  - Advised to receive tetanus vaccine at our pharmacy.    Follow-up  - Follow-up with gastroenterologist next month.        Return in about 6 months (around 12/13/2025), or if symptoms worsen or fail to improve, for Lab F/U, Medication F/U.    Please note that this dictation was created using voice recognition software. I have made every reasonable attempt to correct obvious errors, but I expect that there are errors of grammar and possibly content that I did not discover before finalizing the note.         [1]   Current Outpatient Medications Ordered in Epic   Medication Sig Dispense Refill    diclofenac sodium (VOLTAREN) 1 % Gel Apply 2 g topically 4 times a day as needed (arthritis pain in hand). 150 g 3    VITAMIN D PO Take  by mouth.      mometasone (NASONEX) 50 MCG/ACT nasal spray SPRAY 2 SPRAYS INTO EACH NOSTRIL EVERY DAY      benazepril-hydrochlorthiazide (LOTENSIN HCT) 20-12.5 MG per tablet TAKE 1 TAB BY MOUTH EVERY DAY. 30 Tab 0    estradiol (ESTRACE) 1 MG Tab TAKE 1 TAB BY MOUTH EVERY DAY. 30 Tab 0     No current Epic-ordered facility-administered medications on file.   [2]   Allergies  Allergen Reactions    Clindamycin Nausea    Penicillins      Other Reaction(s): Hives and Rash    Nitrofurantoin     Sulfa Drugs    [3]   Past Medical History:  Diagnosis Date    Allergy     Arthritis     CAP (community acquired pneumonia)     EBV infection 05/17/2016    Hypertension     Kidney disease     Postmenopausal HRT (hormone replacement therapy)     Shingles      L SHOULDER    Vitamin D deficiency 05/17/2016   [4]   Past Surgical History:  Procedure Laterality Date    ABDOMINAL HYSTERECTOMY TOTAL  2005   [5]   Social History  Socioeconomic History    Marital status:     Highest education level: GED or equivalent   Tobacco Use    Smoking status: Every Day     Current packs/day: 0.50     Average packs/day: 1 pack/day for 48.4 years (47.7 ttl pk-yrs)     Types: Cigarettes     Start date: 1/1/1977    Smokeless tobacco: Never    Tobacco comments:     Down to 1/2 pack   Vaping Use    Vaping status: Never Used   Substance and Sexual Activity    Alcohol use: No    Drug use: No    Sexual activity: Yes     Partners: Male     Birth control/protection: Female Sterilization     Social Drivers of Health     Financial Resource Strain: Low Risk  (6/10/2025)    Overall Financial Resource Strain (CARDIA)     Difficulty of Paying Living Expenses: Not very hard   Food Insecurity: No Food Insecurity (6/10/2025)    Hunger Vital Sign     Worried About Running Out of Food in the Last Year: Never true     Ran Out of Food in the Last Year: Never true   Transportation Needs: No Transportation Needs (6/10/2025)    PRAPARE - Transportation     Lack of Transportation (Medical): No     Lack of Transportation (Non-Medical): No   Physical Activity: Inactive (6/10/2025)    Exercise Vital Sign     Days of Exercise per Week: 0 days     Minutes of Exercise per Session: 0 min   Stress: No Stress Concern Present (6/10/2025)    [employer] - Occupational Stress Questionnaire     Feeling of Stress : Not at all   Social Connections: Moderately Isolated (6/10/2025)    Social Connection and Isolation Panel [NHANES]     Frequency of Communication with Friends and Family: More than three times a week     Frequency of Social Gatherings with Friends and Family: Twice a week     Attends [employer] Services: Never     Active Member of Clubs or Organizations: No     Attends Club or  Organization Meetings: Never     Marital Status:    Housing Stability: Low Risk  (6/10/2025)    Housing Stability Vital Sign     Unable to Pay for Housing in the Last Year: No     Number of Times Moved in the Last Year: 0     Homeless in the Last Year: No

## 2025-06-16 ENCOUNTER — DOCUMENTATION (OUTPATIENT)
Dept: HEALTH INFORMATION MANAGEMENT | Facility: OTHER | Age: 65
End: 2025-06-16
Payer: MEDICARE

## 2025-06-18 ENCOUNTER — TELEPHONE (OUTPATIENT)
Dept: HEALTH INFORMATION MANAGEMENT | Facility: OTHER | Age: 65
End: 2025-06-18
Payer: MEDICARE

## 2025-06-18 NOTE — TELEPHONE ENCOUNTER
Outcome:Left VM     Patient has a referral to the Lung Cancer Screening Program. Please verify eligibility before scheduling with Dr. Lang.      Transfer to [facility] Outbound [phone].    Attempt: 1

## 2025-06-23 ENCOUNTER — HOSPITAL ENCOUNTER (OUTPATIENT)
Dept: LAB | Facility: MEDICAL CENTER | Age: 65
End: 2025-06-23
Attending: FAMILY MEDICINE
Payer: MEDICARE

## 2025-06-23 DIAGNOSIS — M19.041 ARTHRITIS OF BOTH HANDS: ICD-10-CM

## 2025-06-23 DIAGNOSIS — F17.210 CIGARETTE SMOKER: ICD-10-CM

## 2025-06-23 DIAGNOSIS — K63.8219 SMALL INTESTINAL BACTERIAL OVERGROWTH (SIBO): ICD-10-CM

## 2025-06-23 DIAGNOSIS — I10 ESSENTIAL HYPERTENSION: ICD-10-CM

## 2025-06-23 DIAGNOSIS — E78.5 DYSLIPIDEMIA: ICD-10-CM

## 2025-06-23 DIAGNOSIS — Z79.890 POSTMENOPAUSAL HRT (HORMONE REPLACEMENT THERAPY): ICD-10-CM

## 2025-06-23 DIAGNOSIS — E55.9 VITAMIN D DEFICIENCY: ICD-10-CM

## 2025-06-23 DIAGNOSIS — M19.042 ARTHRITIS OF BOTH HANDS: ICD-10-CM

## 2025-06-23 LAB
25(OH)D3 SERPL-MCNC: 26 NG/ML (ref 30–100)
ALBUMIN SERPL BCP-MCNC: 3.8 G/DL (ref 3.2–4.9)
ALBUMIN/GLOB SERPL: 1.6 G/DL
ALP SERPL-CCNC: 89 U/L (ref 30–99)
ALT SERPL-CCNC: 10 U/L (ref 2–50)
ANION GAP SERPL CALC-SCNC: 11 MMOL/L (ref 7–16)
AST SERPL-CCNC: 20 U/L (ref 12–45)
BASOPHILS # BLD AUTO: 1.2 % (ref 0–1.8)
BASOPHILS # BLD: 0.13 K/UL (ref 0–0.12)
BILIRUB SERPL-MCNC: 0.5 MG/DL (ref 0.1–1.5)
BUN SERPL-MCNC: 16 MG/DL (ref 8–22)
CALCIUM ALBUM COR SERPL-MCNC: 9.2 MG/DL (ref 8.5–10.5)
CALCIUM SERPL-MCNC: 9 MG/DL (ref 8.5–10.5)
CHLORIDE SERPL-SCNC: 106 MMOL/L (ref 96–112)
CHOLEST SERPL-MCNC: 195 MG/DL (ref 100–199)
CO2 SERPL-SCNC: 26 MMOL/L (ref 20–33)
CREAT SERPL-MCNC: 0.72 MG/DL (ref 0.5–1.4)
EOSINOPHIL # BLD AUTO: 0.44 K/UL (ref 0–0.51)
EOSINOPHIL NFR BLD: 3.9 % (ref 0–6.9)
ERYTHROCYTE [DISTWIDTH] IN BLOOD BY AUTOMATED COUNT: 57.5 FL (ref 35.9–50)
FASTING STATUS PATIENT QL REPORTED: NORMAL
GFR SERPLBLD CREATININE-BSD FMLA CKD-EPI: 92 ML/MIN/1.73 M 2
GLOBULIN SER CALC-MCNC: 2.4 G/DL (ref 1.9–3.5)
GLUCOSE SERPL-MCNC: 83 MG/DL (ref 65–99)
HCT VFR BLD AUTO: 49.2 % (ref 37–47)
HDLC SERPL-MCNC: 48 MG/DL
HGB BLD-MCNC: 16.4 G/DL (ref 12–16)
IMM GRANULOCYTES # BLD AUTO: 0.04 K/UL (ref 0–0.11)
IMM GRANULOCYTES NFR BLD AUTO: 0.4 % (ref 0–0.9)
LDLC SERPL CALC-MCNC: 127 MG/DL
LYMPHOCYTES # BLD AUTO: 3.2 K/UL (ref 1–4.8)
LYMPHOCYTES NFR BLD: 28.6 % (ref 22–41)
MCH RBC QN AUTO: 31.2 PG (ref 27–33)
MCHC RBC AUTO-ENTMCNC: 33.3 G/DL (ref 32.2–35.5)
MCV RBC AUTO: 93.7 FL (ref 81.4–97.8)
MONOCYTES # BLD AUTO: 0.75 K/UL (ref 0–0.85)
MONOCYTES NFR BLD AUTO: 6.7 % (ref 0–13.4)
NEUTROPHILS # BLD AUTO: 6.61 K/UL (ref 1.82–7.42)
NEUTROPHILS NFR BLD: 59.2 % (ref 44–72)
NRBC # BLD AUTO: 0 K/UL
NRBC BLD-RTO: 0 /100 WBC (ref 0–0.2)
PLATELET # BLD AUTO: 236 K/UL (ref 164–446)
PMV BLD AUTO: 11.8 FL (ref 9–12.9)
POTASSIUM SERPL-SCNC: 4.2 MMOL/L (ref 3.6–5.5)
PROT SERPL-MCNC: 6.2 G/DL (ref 6–8.2)
RBC # BLD AUTO: 5.25 M/UL (ref 4.2–5.4)
SODIUM SERPL-SCNC: 143 MMOL/L (ref 135–145)
TRIGL SERPL-MCNC: 99 MG/DL (ref 0–149)
TSH SERPL DL<=0.005 MIU/L-ACNC: 2.34 UIU/ML (ref 0.38–5.33)
WBC # BLD AUTO: 11.2 K/UL (ref 4.8–10.8)

## 2025-06-23 PROCEDURE — 80061 LIPID PANEL: CPT

## 2025-06-23 PROCEDURE — 36415 COLL VENOUS BLD VENIPUNCTURE: CPT

## 2025-06-23 PROCEDURE — 85025 COMPLETE CBC W/AUTO DIFF WBC: CPT

## 2025-06-23 PROCEDURE — 80053 COMPREHEN METABOLIC PANEL: CPT

## 2025-06-23 PROCEDURE — 84443 ASSAY THYROID STIM HORMONE: CPT

## 2025-06-23 PROCEDURE — 82306 VITAMIN D 25 HYDROXY: CPT

## 2025-06-24 ENCOUNTER — RESULTS FOLLOW-UP (OUTPATIENT)
Dept: MEDICAL GROUP | Facility: MEDICAL CENTER | Age: 65
End: 2025-06-24
Payer: MEDICARE

## 2025-07-02 NOTE — TELEPHONE ENCOUNTER
"Lung Cancer Screening Q&A  -please ensure accurate responses to all questions-    Referred by: Edouard Gates    Inclusion Criteria:  - patient must meet all criteria-    Age: 50-77yrs of age, if patient is currently 76yrs old, please ensure there will be enough time for them to complete ILCS and LDCT before 77th birthday.        PLEASE ENTER PATIENTS AGE: 64 yrs    2. Current smoker or if quit, has pt quit within last 15 yrs? Current    3. What is the total number of years patient has smoked cigarettes? 47 yrs    4. What is the average packs per day over total years smoking? (Can be mulitple answers)  half a pack  - (EX: 20 yrs total : 5 years at 2 packs per day, 15 years at 1 pack per day.)    5. Does patient meet minimum requirement of 20 PACK YEAR HISTORY?  YES            Calculate pack year History. [Total years x average packs per day = pack yr history]          23.5    Exclusion Criteria  - patients should have not completed a CT chest imaging in the last 12 months, patients cannot have prior lung cancer DX, any nodules on prior CT chest imaging must be \"stable\" and not newly identified, patient should have no new, worsening, unexplained symptoms associated with active lung cancer.    5. Has patient ever completed a Lung Cancer Screening CT? NO  - if yes, where was it completed? N/A  - if yes, please include last LDCT date. N/A    6. Has patient completed any CT Chest imaging? NO  - If so when was last CT chest imaging? N/A    7. On any prior CT chest imaging was anything noted for the Lungs? N/A    8. Has patient developed any of the following symptoms that are new or worsening in the last 12 months and unattributed to an existing DX ?  NO     -if yes, is the symptom new within the last 12 months? N/A  If yes, is the symptom explained (patient has been seen by a healthcare provider and symptom has been attributed to a DX)? N/A    9. Previous History of Lung Cancer? NO  - if yes, include DX date ,specific " cancer DX , and oncologist if available.      Patient Meets all Inclusion / exclusion criteria?  YES